# Patient Record
Sex: FEMALE | Race: WHITE | URBAN - METROPOLITAN AREA
[De-identification: names, ages, dates, MRNs, and addresses within clinical notes are randomized per-mention and may not be internally consistent; named-entity substitution may affect disease eponyms.]

---

## 2019-06-01 ENCOUNTER — OUTPATIENT (OUTPATIENT)
Dept: OUTPATIENT SERVICES | Facility: HOSPITAL | Age: 29
LOS: 1 days | End: 2019-06-01
Payer: MEDICAID

## 2019-06-01 PROCEDURE — G9001: CPT

## 2019-06-02 ENCOUNTER — EMERGENCY (EMERGENCY)
Facility: HOSPITAL | Age: 29
LOS: 0 days | Discharge: HOME | End: 2019-06-02

## 2019-06-02 ENCOUNTER — INPATIENT (INPATIENT)
Facility: HOSPITAL | Age: 29
LOS: 0 days | Discharge: LEFT BEFORE DISCHARGE | End: 2019-06-03
Attending: SURGERY | Admitting: SURGERY
Payer: MEDICAID

## 2019-06-02 VITALS
SYSTOLIC BLOOD PRESSURE: 122 MMHG | TEMPERATURE: 97 F | DIASTOLIC BLOOD PRESSURE: 60 MMHG | HEART RATE: 90 BPM | OXYGEN SATURATION: 98 % | RESPIRATION RATE: 20 BRPM

## 2019-06-02 VITALS
SYSTOLIC BLOOD PRESSURE: 113 MMHG | DIASTOLIC BLOOD PRESSURE: 81 MMHG | HEART RATE: 113 BPM | OXYGEN SATURATION: 97 % | TEMPERATURE: 101 F

## 2019-06-02 DIAGNOSIS — L03.113 CELLULITIS OF RIGHT UPPER LIMB: ICD-10-CM

## 2019-06-02 DIAGNOSIS — Z02.9 ENCOUNTER FOR ADMINISTRATIVE EXAMINATIONS, UNSPECIFIED: ICD-10-CM

## 2019-06-02 LAB
ANION GAP SERPL CALC-SCNC: 13 MMOL/L — SIGNIFICANT CHANGE UP (ref 7–14)
BASOPHILS # BLD AUTO: 0.05 K/UL — SIGNIFICANT CHANGE UP (ref 0–0.2)
BASOPHILS NFR BLD AUTO: 0.3 % — SIGNIFICANT CHANGE UP (ref 0–1)
BUN SERPL-MCNC: 12 MG/DL — SIGNIFICANT CHANGE UP (ref 10–20)
CALCIUM SERPL-MCNC: 9.2 MG/DL — SIGNIFICANT CHANGE UP (ref 8.5–10.1)
CHLORIDE SERPL-SCNC: 100 MMOL/L — SIGNIFICANT CHANGE UP (ref 98–110)
CO2 SERPL-SCNC: 21 MMOL/L — SIGNIFICANT CHANGE UP (ref 17–32)
CREAT SERPL-MCNC: 0.7 MG/DL — SIGNIFICANT CHANGE UP (ref 0.7–1.5)
EOSINOPHIL # BLD AUTO: 0.5 K/UL — SIGNIFICANT CHANGE UP (ref 0–0.7)
EOSINOPHIL NFR BLD AUTO: 2.9 % — SIGNIFICANT CHANGE UP (ref 0–8)
GLUCOSE SERPL-MCNC: 82 MG/DL — SIGNIFICANT CHANGE UP (ref 70–99)
HCT VFR BLD CALC: 39.1 % — SIGNIFICANT CHANGE UP (ref 37–47)
HGB BLD-MCNC: 13.4 G/DL — SIGNIFICANT CHANGE UP (ref 12–16)
HIV 1 & 2 AB SERPL IA.RAPID: SIGNIFICANT CHANGE UP
IMM GRANULOCYTES NFR BLD AUTO: 0.5 % — HIGH (ref 0.1–0.3)
LACTATE SERPL-SCNC: 0.6 MMOL/L — SIGNIFICANT CHANGE UP (ref 0.5–2.2)
LYMPHOCYTES # BLD AUTO: 15.4 % — LOW (ref 20.5–51.1)
LYMPHOCYTES # BLD AUTO: 2.68 K/UL — SIGNIFICANT CHANGE UP (ref 1.2–3.4)
MCHC RBC-ENTMCNC: 29 PG — SIGNIFICANT CHANGE UP (ref 27–31)
MCHC RBC-ENTMCNC: 34.3 G/DL — SIGNIFICANT CHANGE UP (ref 32–37)
MCV RBC AUTO: 84.6 FL — SIGNIFICANT CHANGE UP (ref 81–99)
MONOCYTES # BLD AUTO: 1.11 K/UL — HIGH (ref 0.1–0.6)
MONOCYTES NFR BLD AUTO: 6.4 % — SIGNIFICANT CHANGE UP (ref 1.7–9.3)
NEUTROPHILS # BLD AUTO: 12.96 K/UL — HIGH (ref 1.4–6.5)
NEUTROPHILS NFR BLD AUTO: 74.5 % — SIGNIFICANT CHANGE UP (ref 42.2–75.2)
NRBC # BLD: 0 /100 WBCS — SIGNIFICANT CHANGE UP (ref 0–0)
PLATELET # BLD AUTO: 289 K/UL — SIGNIFICANT CHANGE UP (ref 130–400)
POTASSIUM SERPL-MCNC: 3.6 MMOL/L — SIGNIFICANT CHANGE UP (ref 3.5–5)
POTASSIUM SERPL-SCNC: 3.6 MMOL/L — SIGNIFICANT CHANGE UP (ref 3.5–5)
RBC # BLD: 4.62 M/UL — SIGNIFICANT CHANGE UP (ref 4.2–5.4)
RBC # FLD: 11.9 % — SIGNIFICANT CHANGE UP (ref 11.5–14.5)
SODIUM SERPL-SCNC: 134 MMOL/L — LOW (ref 135–146)
WBC # BLD: 17.38 K/UL — HIGH (ref 4.8–10.8)
WBC # FLD AUTO: 17.38 K/UL — HIGH (ref 4.8–10.8)

## 2019-06-02 PROCEDURE — 99284 EMERGENCY DEPT VISIT MOD MDM: CPT

## 2019-06-02 PROCEDURE — 99283 EMERGENCY DEPT VISIT LOW MDM: CPT | Mod: 25

## 2019-06-02 PROCEDURE — 10060 I&D ABSCESS SIMPLE/SINGLE: CPT

## 2019-06-02 PROCEDURE — 73130 X-RAY EXAM OF HAND: CPT | Mod: 26,RT

## 2019-06-02 RX ORDER — SODIUM CHLORIDE 9 MG/ML
1000 INJECTION, SOLUTION INTRAVENOUS
Refills: 0 | Status: DISCONTINUED | OUTPATIENT
Start: 2019-06-02 | End: 2019-06-03

## 2019-06-02 RX ORDER — PANTOPRAZOLE SODIUM 20 MG/1
40 TABLET, DELAYED RELEASE ORAL DAILY
Refills: 0 | Status: DISCONTINUED | OUTPATIENT
Start: 2019-06-02 | End: 2019-06-03

## 2019-06-02 RX ORDER — AMPICILLIN SODIUM AND SULBACTAM SODIUM 250; 125 MG/ML; MG/ML
3 INJECTION, POWDER, FOR SUSPENSION INTRAMUSCULAR; INTRAVENOUS ONCE
Refills: 0 | Status: DISCONTINUED | OUTPATIENT
Start: 2019-06-02 | End: 2019-06-03

## 2019-06-02 RX ORDER — HEPARIN SODIUM 5000 [USP'U]/ML
5000 INJECTION INTRAVENOUS; SUBCUTANEOUS EVERY 8 HOURS
Refills: 0 | Status: DISCONTINUED | OUTPATIENT
Start: 2019-06-02 | End: 2019-06-03

## 2019-06-02 RX ORDER — SODIUM CHLORIDE 9 MG/ML
3 INJECTION INTRAMUSCULAR; INTRAVENOUS; SUBCUTANEOUS EVERY 8 HOURS
Refills: 0 | Status: DISCONTINUED | OUTPATIENT
Start: 2019-06-02 | End: 2019-06-03

## 2019-06-02 RX ORDER — IBUPROFEN 200 MG
600 TABLET ORAL ONCE
Refills: 0 | Status: COMPLETED | OUTPATIENT
Start: 2019-06-02 | End: 2019-06-02

## 2019-06-02 RX ORDER — CEFAZOLIN SODIUM 1 G
1000 VIAL (EA) INJECTION ONCE
Refills: 0 | Status: COMPLETED | OUTPATIENT
Start: 2019-06-02 | End: 2019-06-02

## 2019-06-02 RX ORDER — TETANUS TOXOID, REDUCED DIPHTHERIA TOXOID AND ACELLULAR PERTUSSIS VACCINE, ADSORBED 5; 2.5; 8; 8; 2.5 [IU]/.5ML; [IU]/.5ML; UG/.5ML; UG/.5ML; UG/.5ML
0.5 SUSPENSION INTRAMUSCULAR ONCE
Refills: 0 | Status: DISCONTINUED | OUTPATIENT
Start: 2019-06-02 | End: 2019-06-03

## 2019-06-02 RX ORDER — AMPICILLIN SODIUM AND SULBACTAM SODIUM 250; 125 MG/ML; MG/ML
INJECTION, POWDER, FOR SUSPENSION INTRAMUSCULAR; INTRAVENOUS
Refills: 0 | Status: DISCONTINUED | OUTPATIENT
Start: 2019-06-02 | End: 2019-06-03

## 2019-06-02 RX ORDER — AMPICILLIN SODIUM AND SULBACTAM SODIUM 250; 125 MG/ML; MG/ML
3 INJECTION, POWDER, FOR SUSPENSION INTRAMUSCULAR; INTRAVENOUS EVERY 6 HOURS
Refills: 0 | Status: DISCONTINUED | OUTPATIENT
Start: 2019-06-03 | End: 2019-06-03

## 2019-06-02 RX ADMIN — Medication 600 MILLIGRAM(S): at 13:18

## 2019-06-02 RX ADMIN — SODIUM CHLORIDE 3 MILLILITER(S): 9 INJECTION INTRAMUSCULAR; INTRAVENOUS; SUBCUTANEOUS at 16:04

## 2019-06-02 RX ADMIN — Medication 100 MILLIGRAM(S): at 14:18

## 2019-06-02 NOTE — ED PROVIDER NOTE - PHYSICAL EXAMINATION
CONST: Well appearing in NAD  NECK: Non-tender, no meningeal signs  CARD: Normal S1 S2; Normal rate and rhythm  RESP: Equal BS B/L, No wheezes, rhonchi or rales. No distress  MS: Normal ROM in all extremities. (+) R 4th finger deformity and pain with ROM.   SKIN: (+) R 4th finger with abrasion and surrounding cellulitis extending down to hand.  Good turgor  NEURO: A&Ox3, No focal deficits. Strength 5/5 with no sensory deficits. Steady gait

## 2019-06-02 NOTE — H&P ADULT - NSHPPHYSICALEXAM_GEN_ALL_CORE
Vital Signs Last 24 Hrs  T(C): 36.1 (02 Jun 2019 12:29), Max: 36.1 (02 Jun 2019 12:29)  T(F): 97 (02 Jun 2019 12:29), Max: 97 (02 Jun 2019 12:29)  HR: 90 (02 Jun 2019 12:29) (90 - 90)  BP: 122/60 (02 Jun 2019 12:29) (122/60 - 122/60)  BP(mean): --  RR: 20 (02 Jun 2019 12:29) (20 - 20)  SpO2: 98% (02 Jun 2019 12:29) (98% - 98%)\    exam: sitting up on exam table no acute distress  EXAM: Right hand edema and erythema extending to mid forearm

## 2019-06-02 NOTE — H&P ADULT - ASSESSMENT
29 y/o F with right hand cellulitis   - unasyn  - f/u ID   - hand elevation 29 y/o F with right hand cellulitis   - unasyn  - I&D with cx  -ESR, CRP  -Utox  - f/u ID   - hand elevation on posey block  - bid hand soaks and dressing changes

## 2019-06-02 NOTE — ED PROVIDER NOTE - CLINICAL SUMMARY MEDICAL DECISION MAKING FREE TEXT BOX
L 4th finger cellulitis extending to hand - wbc 17, iv abx given, Hand consulted, admitted to medicine for further mgmt

## 2019-06-02 NOTE — H&P ADULT - HISTORY OF PRESENT ILLNESS
Pt presents to ED c/o right hand swelling/deformity and pain associated with movement, also c/o limited strength. She states she thinks it is caused by an insect bite. no fever/chills. denies any iv drug abuse. Pt presents to ED c/o right hand swelling/deformity and pain associated with movement, also c/o limited strength. She states she thinks it is caused by an insect bite. no fever/chills. denies any iv drug abuse. admits to cocaine, ecstacy, marijuana, cigarette

## 2019-06-02 NOTE — ED PROVIDER NOTE - ATTENDING CONTRIBUTION TO CARE
28y f h/o etoh/drug abuse p/w R 4th digit pain x sev days. Pt states she thinks something bit her hand, will not provide additional details. Now with erythema & swelling up dorsal hand. Denies any other tx. Denies ivda. PE: young f nad, ncat, neck supple, rrr nl s1s2, ctab, ext- R 4th finger w/abrasion & surrounding cellulitis extending down to hand, digit & hand nvi, nl rom/strength/sensation throughout, cr<2s, dpi, remainder ext exam nl.

## 2019-06-02 NOTE — PROCEDURE NOTE - NSICDXPROCEDURE_GEN_ALL_CORE_FT
PROCEDURES:  Incision and drainage of wound of finger 02-Jun-2019 18:17:41 right 1st digit Yanci Gregory PROCEDURES:  Incision and drainage of wound of finger 02-Jun-2019 18:17:41 right 3rd digit Yanci Gregory

## 2019-06-02 NOTE — H&P ADULT - NSHPLABSRESULTS_GEN_ALL_CORE
13.4   17.38 )-----------( 289      ( 02 Jun 2019 14:13 )             39.1       Auto Neutrophil %: 74.5 % (06-02-19 @ 14:13)  Auto Immature Granulocyte %: 0.5 % (06-02-19 @ 14:13)    06-02    134<L>  |  100  |  12  ----------------------------<  82  3.6   |  21  |  0.7      Calcium, Total Serum: 9.2 mg/dL (06-02-19 @ 14:13)      LFTs:     Lactate, Blood: 0.6 mmol/L (06-02-19 @ 14:13)    < from: Xray Hand 3 Views, Right (06.02.19 @ 13:14) >    Findings/  Impression:    No definite evidence of acute fracture or dislocation. Joint spaces are   grossly unremarkable. No inadvertent radiopaque foreign body.    < end of copied text >

## 2019-06-02 NOTE — H&P ADULT - ATTENDING COMMENTS
Pt underwent I&D with drainage of purulent fluid.  Received IV abx in ED.    Went to ED to see patient, who was pending admission to floor, for bedside exam.  ED notified/updated me and resident that patient eloped    Recall PRN ED return Spoke with Surgery consult resident -Daniel this afternoon.    27 yo F with history of substance abuse who presents to ED with right finger swelling with associated erythema with 3 day history of worsening redness and pain.  No fevers, chills.      Report of tenderness of fluctuant dorsal right ring finger with erythema, no acute pain of PROM of MCP/IPJs    WBC 17    A/P: right hand and finger cellulitis with dorsal ring finger abscess  Recommend admit for IV abx, I&D, and ID consult.    Pt underwent I&D of dorsal right ring finger with drainage of purulent fluid.  Received IV abx in ED.    I went to ED to see patient this evening at ~7:35 pm for bedside exam with Surgery Blue team intern.  She was pending admission to floor.  ED notified/updated me and resident that patient eloped from ED.    Recall PRN ED return

## 2019-06-02 NOTE — ED PROVIDER NOTE - PROGRESS NOTE DETAILS
Patient eloped with IV. RN called patient back to remove IV. Patient returned and IV removed. Patient left ED.

## 2019-06-02 NOTE — ED PROVIDER NOTE - NS ED ROS FT
Constitutional: See HPI. No fever/chills.  Neck: No neck pain or stiffness.  Cardiac: No chest pain, SOB or edema. No chest pain with exertion.  Respiratory: No cough or respiratory distress. No hemoptysis.   GI: No nausea, vomiting, diarrhea or abdominal pain.  MS: No myalgia, muscle weakness or back pain. (+) finger pain.   Neuro: No headache or weakness. No LOC.  Skin: (+) redness.   Except as documented in the HPI, all other systems are negative.

## 2019-06-02 NOTE — ED PROVIDER NOTE - OBJECTIVE STATEMENT
29 y/o F with PMH of ETOH and drug abuse, presents to ED c/o “I think something bit my R 4th finger, I have a lot of pain and swelling down the R hand now.” No trauma. No fever or chills. Denies IV drug abuse.

## 2019-06-03 ENCOUNTER — INPATIENT (INPATIENT)
Facility: HOSPITAL | Age: 29
LOS: 3 days | Discharge: HOME | End: 2019-06-07
Attending: HOSPITALIST | Admitting: HOSPITALIST
Payer: MEDICAID

## 2019-06-03 VITALS
TEMPERATURE: 99 F | RESPIRATION RATE: 18 BRPM | DIASTOLIC BLOOD PRESSURE: 79 MMHG | HEART RATE: 133 BPM | OXYGEN SATURATION: 99 % | SYSTOLIC BLOOD PRESSURE: 123 MMHG

## 2019-06-03 LAB
C TRACH RRNA SPEC QL NAA+PROBE: SIGNIFICANT CHANGE UP
N GONORRHOEA RRNA SPEC QL NAA+PROBE: DETECTED
SPECIMEN SOURCE: SIGNIFICANT CHANGE UP

## 2019-06-03 PROCEDURE — 99285 EMERGENCY DEPT VISIT HI MDM: CPT | Mod: 25

## 2019-06-03 RX ORDER — VANCOMYCIN HCL 1 G
1000 VIAL (EA) INTRAVENOUS ONCE
Refills: 0 | Status: COMPLETED | OUTPATIENT
Start: 2019-06-03 | End: 2019-06-04

## 2019-06-03 RX ORDER — CEFTRIAXONE 500 MG/1
1 INJECTION, POWDER, FOR SOLUTION INTRAMUSCULAR; INTRAVENOUS ONCE
Refills: 0 | Status: COMPLETED | OUTPATIENT
Start: 2019-06-03 | End: 2019-06-03

## 2019-06-03 RX ORDER — SODIUM CHLORIDE 9 MG/ML
1000 INJECTION, SOLUTION INTRAVENOUS ONCE
Refills: 0 | Status: COMPLETED | OUTPATIENT
Start: 2019-06-03 | End: 2019-06-03

## 2019-06-03 NOTE — ED PROVIDER NOTE - OBJECTIVE STATEMENT
29 y/o female with no PMH who presents to ED for infection to the right middle finger. Pt was seen in this ED yesterday and admitted however she eloped. Now states redness and swelling have increased prompting pt come to ed for evaluation.

## 2019-06-03 NOTE — CHART NOTE - NSCHARTNOTEFT_GEN_A_CORE
During rounds at 7:30pm yesterday patient was not seen in ED room.  Bed management called, stated patient was moved to 4B 21B.  Patient was not present on 4B and nurse covering stated she had not gotten report for the patient yet.  ED PA stated that the patient was seen smoking outside the hospital s/p I&D of right finger.  Patient, at that time, was counseled on the importance of staying and receiving proper care with IV antibiotics and she stated that she "just wanted to go out and smoke".  Patient never returned to ED room, police were contacted and patient was found and returned to ED.  Patient at that time stated she did not want to stay, patient's IV was removed, and patient left ED despite recommendations to stay for proper medical care.

## 2019-06-03 NOTE — ED PROVIDER NOTE - PHYSICAL EXAMINATION
CONSTITUTIONAL: Well-developed; well-nourished; in no acute distress.   SKIN: warm, dry  HEAD: Normocephalic; atraumatic.  EYES: no conjunctival injection. PERRL.   ENT: No nasal discharge; airway clear.  NECK: Supple; non tender.  CARD: S1, S2 normal; no murmurs, gallops, or rubs. Regular rate and rhythm.   RESP: No wheezes, rales or rhonchi.  ABD: soft ntnd  EXT: + swelling of the right middle finger with held in flexion and pain on extension. no ttp over flexor surface. Normal ROM.  No clubbing, cyanosis or edema.   LYMPH: No acute cervical adenopathy.  NEURO: Alert, oriented, grossly unremarkable  PSYCH: Cooperative, appropriate.

## 2019-06-03 NOTE — ED PROVIDER NOTE - ATTENDING CONTRIBUTION TO CARE
I personally evaluated the patient. I reviewed the Resident’s or Physician Assistant’s note (as assigned above), and agree with the findings and plan except as documented in my note.    27 y/o female with no PMH who presents to ED for infection to the right middle finger. Pt was seen in this ED yesterday and admitted but eloped. Now coming back for wrosening infection. On exam r middle finger consistent w infectious tenosynovitis. Labs, cultures, abx ordered. Hand suregry consult placed.     Plan-IV abx and admit

## 2019-06-03 NOTE — ED PROVIDER NOTE - PROGRESS NOTE DETAILS
discussed with surgery as pt was admitted to their service yesterday, will evaluate pt. Ortho on call for hand surgery. Multiple pages placed to ortho with no call back Per ortho admit and they will see pt in the AM.

## 2019-06-04 DIAGNOSIS — Z71.89 OTHER SPECIFIED COUNSELING: ICD-10-CM

## 2019-06-04 LAB
-  AMPICILLIN/SULBACTAM: SIGNIFICANT CHANGE UP
-  CEFAZOLIN: SIGNIFICANT CHANGE UP
-  CLINDAMYCIN: SIGNIFICANT CHANGE UP
-  DAPTOMYCIN: SIGNIFICANT CHANGE UP
-  ERYTHROMYCIN: SIGNIFICANT CHANGE UP
-  GENTAMICIN: SIGNIFICANT CHANGE UP
-  LINEZOLID: SIGNIFICANT CHANGE UP
-  OXACILLIN: SIGNIFICANT CHANGE UP
-  PENICILLIN: SIGNIFICANT CHANGE UP
-  RIFAMPIN: SIGNIFICANT CHANGE UP
-  TETRACYCLINE: SIGNIFICANT CHANGE UP
-  TRIMETHOPRIM/SULFAMETHOXAZOLE: SIGNIFICANT CHANGE UP
-  VANCOMYCIN: SIGNIFICANT CHANGE UP
ALBUMIN SERPL ELPH-MCNC: 3.6 G/DL — SIGNIFICANT CHANGE UP (ref 3.5–5.2)
ALBUMIN SERPL ELPH-MCNC: 3.9 G/DL — SIGNIFICANT CHANGE UP (ref 3.5–5.2)
ALP SERPL-CCNC: 71 U/L — SIGNIFICANT CHANGE UP (ref 30–115)
ALP SERPL-CCNC: 81 U/L — SIGNIFICANT CHANGE UP (ref 30–115)
ALT FLD-CCNC: 12 U/L — SIGNIFICANT CHANGE UP (ref 0–41)
ALT FLD-CCNC: 13 U/L — SIGNIFICANT CHANGE UP (ref 0–41)
AMPHET UR-MCNC: NEGATIVE — SIGNIFICANT CHANGE UP
ANION GAP SERPL CALC-SCNC: 14 MMOL/L — SIGNIFICANT CHANGE UP (ref 7–14)
ANION GAP SERPL CALC-SCNC: 15 MMOL/L — HIGH (ref 7–14)
APTT BLD: 30.6 SEC — SIGNIFICANT CHANGE UP (ref 27–39.2)
AST SERPL-CCNC: 14 U/L — SIGNIFICANT CHANGE UP (ref 0–41)
AST SERPL-CCNC: 14 U/L — SIGNIFICANT CHANGE UP (ref 0–41)
BARBITURATES UR SCN-MCNC: NEGATIVE — SIGNIFICANT CHANGE UP
BASE EXCESS BLDV CALC-SCNC: -0.1 MMOL/L — SIGNIFICANT CHANGE UP (ref -2–2)
BASOPHILS # BLD AUTO: 0.04 K/UL — SIGNIFICANT CHANGE UP (ref 0–0.2)
BASOPHILS # BLD AUTO: 0.06 K/UL — SIGNIFICANT CHANGE UP (ref 0–0.2)
BASOPHILS NFR BLD AUTO: 0.2 % — SIGNIFICANT CHANGE UP (ref 0–1)
BASOPHILS NFR BLD AUTO: 0.3 % — SIGNIFICANT CHANGE UP (ref 0–1)
BENZODIAZ UR-MCNC: NEGATIVE — SIGNIFICANT CHANGE UP
BILIRUB DIRECT SERPL-MCNC: <0.2 MG/DL — SIGNIFICANT CHANGE UP (ref 0–0.2)
BILIRUB INDIRECT FLD-MCNC: SIGNIFICANT CHANGE UP MG/DL (ref 0.2–1.2)
BILIRUB SERPL-MCNC: <0.2 MG/DL — SIGNIFICANT CHANGE UP (ref 0.2–1.2)
BILIRUB SERPL-MCNC: <0.2 MG/DL — SIGNIFICANT CHANGE UP (ref 0.2–1.2)
BLD GP AB SCN SERPL QL: SIGNIFICANT CHANGE UP
BUN SERPL-MCNC: 11 MG/DL — SIGNIFICANT CHANGE UP (ref 10–20)
BUN SERPL-MCNC: 7 MG/DL — LOW (ref 10–20)
CA-I SERPL-SCNC: 1.16 MMOL/L — SIGNIFICANT CHANGE UP (ref 1.12–1.3)
CALCIUM SERPL-MCNC: 8 MG/DL — LOW (ref 8.5–10.1)
CALCIUM SERPL-MCNC: 8.7 MG/DL — SIGNIFICANT CHANGE UP (ref 8.5–10.1)
CHLORIDE SERPL-SCNC: 100 MMOL/L — SIGNIFICANT CHANGE UP (ref 98–110)
CHLORIDE SERPL-SCNC: 101 MMOL/L — SIGNIFICANT CHANGE UP (ref 98–110)
CO2 SERPL-SCNC: 22 MMOL/L — SIGNIFICANT CHANGE UP (ref 17–32)
CO2 SERPL-SCNC: 22 MMOL/L — SIGNIFICANT CHANGE UP (ref 17–32)
COCAINE METAB.OTHER UR-MCNC: POSITIVE
CREAT SERPL-MCNC: 0.6 MG/DL — LOW (ref 0.7–1.5)
CREAT SERPL-MCNC: 0.7 MG/DL — SIGNIFICANT CHANGE UP (ref 0.7–1.5)
CRP SERPL-MCNC: 2.36 MG/DL — HIGH (ref 0–0.4)
EOSINOPHIL # BLD AUTO: 0.28 K/UL — SIGNIFICANT CHANGE UP (ref 0–0.7)
EOSINOPHIL # BLD AUTO: 0.3 K/UL — SIGNIFICANT CHANGE UP (ref 0–0.7)
EOSINOPHIL NFR BLD AUTO: 1.3 % — SIGNIFICANT CHANGE UP (ref 0–8)
EOSINOPHIL NFR BLD AUTO: 1.9 % — SIGNIFICANT CHANGE UP (ref 0–8)
ERYTHROCYTE [SEDIMENTATION RATE] IN BLOOD: 52 MM/HR — HIGH (ref 0–15)
GAS PNL BLDV: 142 MMOL/L — SIGNIFICANT CHANGE UP (ref 136–145)
GAS PNL BLDV: SIGNIFICANT CHANGE UP
GLUCOSE SERPL-MCNC: 107 MG/DL — HIGH (ref 70–99)
GLUCOSE SERPL-MCNC: 112 MG/DL — HIGH (ref 70–99)
HCG UR QL: NEGATIVE — SIGNIFICANT CHANGE UP
HCO3 BLDV-SCNC: 26 MMOL/L — SIGNIFICANT CHANGE UP (ref 22–29)
HCT VFR BLD CALC: 34.4 % — LOW (ref 37–47)
HCT VFR BLD CALC: 38.9 % — SIGNIFICANT CHANGE UP (ref 37–47)
HCT VFR BLDA CALC: 39.3 % — SIGNIFICANT CHANGE UP (ref 34–44)
HGB BLD CALC-MCNC: 12.8 G/DL — LOW (ref 14–18)
HGB BLD-MCNC: 11.5 G/DL — LOW (ref 12–16)
HGB BLD-MCNC: 13.2 G/DL — SIGNIFICANT CHANGE UP (ref 12–16)
IMM GRANULOCYTES NFR BLD AUTO: 0.5 % — HIGH (ref 0.1–0.3)
IMM GRANULOCYTES NFR BLD AUTO: 0.6 % — HIGH (ref 0.1–0.3)
INR BLD: 1.14 RATIO — SIGNIFICANT CHANGE UP (ref 0.65–1.3)
LACTATE BLDV-MCNC: 1.7 MMOL/L — HIGH (ref 0.5–1.6)
LYMPHOCYTES # BLD AUTO: 17.4 % — LOW (ref 20.5–51.1)
LYMPHOCYTES # BLD AUTO: 17.6 % — LOW (ref 20.5–51.1)
LYMPHOCYTES # BLD AUTO: 2.84 K/UL — SIGNIFICANT CHANGE UP (ref 1.2–3.4)
LYMPHOCYTES # BLD AUTO: 3.72 K/UL — HIGH (ref 1.2–3.4)
MAGNESIUM SERPL-MCNC: 2.3 MG/DL — SIGNIFICANT CHANGE UP (ref 1.8–2.4)
MCHC RBC-ENTMCNC: 28.6 PG — SIGNIFICANT CHANGE UP (ref 27–31)
MCHC RBC-ENTMCNC: 28.9 PG — SIGNIFICANT CHANGE UP (ref 27–31)
MCHC RBC-ENTMCNC: 33.4 G/DL — SIGNIFICANT CHANGE UP (ref 32–37)
MCHC RBC-ENTMCNC: 33.9 G/DL — SIGNIFICANT CHANGE UP (ref 32–37)
MCV RBC AUTO: 85.3 FL — SIGNIFICANT CHANGE UP (ref 81–99)
MCV RBC AUTO: 85.6 FL — SIGNIFICANT CHANGE UP (ref 81–99)
METHADONE UR-MCNC: NEGATIVE — SIGNIFICANT CHANGE UP
METHOD TYPE: SIGNIFICANT CHANGE UP
MONOCYTES # BLD AUTO: 0.97 K/UL — HIGH (ref 0.1–0.6)
MONOCYTES # BLD AUTO: 1.09 K/UL — HIGH (ref 0.1–0.6)
MONOCYTES NFR BLD AUTO: 5.1 % — SIGNIFICANT CHANGE UP (ref 1.7–9.3)
MONOCYTES NFR BLD AUTO: 6 % — SIGNIFICANT CHANGE UP (ref 1.7–9.3)
NEUTROPHILS # BLD AUTO: 11.87 K/UL — HIGH (ref 1.4–6.5)
NEUTROPHILS # BLD AUTO: 16.09 K/UL — HIGH (ref 1.4–6.5)
NEUTROPHILS NFR BLD AUTO: 73.7 % — SIGNIFICANT CHANGE UP (ref 42.2–75.2)
NEUTROPHILS NFR BLD AUTO: 75.4 % — HIGH (ref 42.2–75.2)
NRBC # BLD: 0 /100 WBCS — SIGNIFICANT CHANGE UP (ref 0–0)
NRBC # BLD: 0 /100 WBCS — SIGNIFICANT CHANGE UP (ref 0–0)
OPIATES UR-MCNC: NEGATIVE — SIGNIFICANT CHANGE UP
PCO2 BLDV: 47 MMHG — SIGNIFICANT CHANGE UP (ref 41–51)
PCP SPEC-MCNC: SIGNIFICANT CHANGE UP
PH BLDV: 7.35 — SIGNIFICANT CHANGE UP (ref 7.26–7.43)
PHOSPHATE SERPL-MCNC: 2.7 MG/DL — SIGNIFICANT CHANGE UP (ref 2.1–4.9)
PLATELET # BLD AUTO: 299 K/UL — SIGNIFICANT CHANGE UP (ref 130–400)
PLATELET # BLD AUTO: 322 K/UL — SIGNIFICANT CHANGE UP (ref 130–400)
PO2 BLDV: 35 MMHG — SIGNIFICANT CHANGE UP (ref 20–40)
POTASSIUM BLDV-SCNC: 3 MMOL/L — LOW (ref 3.3–5.6)
POTASSIUM SERPL-MCNC: 3.1 MMOL/L — LOW (ref 3.5–5)
POTASSIUM SERPL-MCNC: 3.8 MMOL/L — SIGNIFICANT CHANGE UP (ref 3.5–5)
POTASSIUM SERPL-SCNC: 3.1 MMOL/L — LOW (ref 3.5–5)
POTASSIUM SERPL-SCNC: 3.8 MMOL/L — SIGNIFICANT CHANGE UP (ref 3.5–5)
PROPOXYPHENE QUALITATIVE URINE RESULT: NEGATIVE — SIGNIFICANT CHANGE UP
PROT SERPL-MCNC: 6.4 G/DL — SIGNIFICANT CHANGE UP (ref 6–8)
PROT SERPL-MCNC: 7.2 G/DL — SIGNIFICANT CHANGE UP (ref 6–8)
PROTHROM AB SERPL-ACNC: 13.1 SEC — HIGH (ref 9.95–12.87)
RBC # BLD: 4.02 M/UL — LOW (ref 4.2–5.4)
RBC # BLD: 4.56 M/UL — SIGNIFICANT CHANGE UP (ref 4.2–5.4)
RBC # FLD: 11.7 % — SIGNIFICANT CHANGE UP (ref 11.5–14.5)
RBC # FLD: 11.8 % — SIGNIFICANT CHANGE UP (ref 11.5–14.5)
SAO2 % BLDV: 64 % — SIGNIFICANT CHANGE UP
SODIUM SERPL-SCNC: 137 MMOL/L — SIGNIFICANT CHANGE UP (ref 135–146)
SODIUM SERPL-SCNC: 137 MMOL/L — SIGNIFICANT CHANGE UP (ref 135–146)
WBC # BLD: 16.11 K/UL — HIGH (ref 4.8–10.8)
WBC # BLD: 21.35 K/UL — HIGH (ref 4.8–10.8)
WBC # FLD AUTO: 16.11 K/UL — HIGH (ref 4.8–10.8)
WBC # FLD AUTO: 21.35 K/UL — HIGH (ref 4.8–10.8)

## 2019-06-04 PROCEDURE — 99223 1ST HOSP IP/OBS HIGH 75: CPT | Mod: AI

## 2019-06-04 PROCEDURE — 93010 ELECTROCARDIOGRAM REPORT: CPT

## 2019-06-04 PROCEDURE — 73130 X-RAY EXAM OF HAND: CPT | Mod: 26,LT

## 2019-06-04 RX ORDER — CEFTRIAXONE 500 MG/1
INJECTION, POWDER, FOR SOLUTION INTRAMUSCULAR; INTRAVENOUS
Refills: 0 | Status: DISCONTINUED | OUTPATIENT
Start: 2019-06-04 | End: 2019-06-05

## 2019-06-04 RX ORDER — POTASSIUM CHLORIDE 20 MEQ
20 PACKET (EA) ORAL
Refills: 0 | Status: DISCONTINUED | OUTPATIENT
Start: 2019-06-04 | End: 2019-06-04

## 2019-06-04 RX ORDER — IBUPROFEN 200 MG
400 TABLET ORAL THREE TIMES A DAY
Refills: 0 | Status: DISCONTINUED | OUTPATIENT
Start: 2019-06-04 | End: 2019-06-05

## 2019-06-04 RX ORDER — MAGNESIUM SULFATE 500 MG/ML
2 VIAL (ML) INJECTION ONCE
Refills: 0 | Status: COMPLETED | OUTPATIENT
Start: 2019-06-04 | End: 2019-06-04

## 2019-06-04 RX ORDER — CEFAZOLIN SODIUM 1 G
1000 VIAL (EA) INJECTION ONCE
Refills: 0 | Status: COMPLETED | OUTPATIENT
Start: 2019-06-04 | End: 2019-06-04

## 2019-06-04 RX ORDER — ENOXAPARIN SODIUM 100 MG/ML
40 INJECTION SUBCUTANEOUS EVERY 24 HOURS
Refills: 0 | Status: DISCONTINUED | OUTPATIENT
Start: 2019-06-04 | End: 2019-06-07

## 2019-06-04 RX ORDER — CEFAZOLIN SODIUM 1 G
VIAL (EA) INJECTION
Refills: 0 | Status: DISCONTINUED | OUTPATIENT
Start: 2019-06-04 | End: 2019-06-04

## 2019-06-04 RX ORDER — ACYCLOVIR SODIUM 500 MG
400 VIAL (EA) INTRAVENOUS DAILY
Refills: 0 | Status: DISCONTINUED | OUTPATIENT
Start: 2019-06-04 | End: 2019-06-07

## 2019-06-04 RX ORDER — CEFTRIAXONE 500 MG/1
2 INJECTION, POWDER, FOR SOLUTION INTRAMUSCULAR; INTRAVENOUS ONCE
Refills: 0 | Status: COMPLETED | OUTPATIENT
Start: 2019-06-04 | End: 2019-06-04

## 2019-06-04 RX ORDER — ACETAMINOPHEN 500 MG
650 TABLET ORAL EVERY 6 HOURS
Refills: 0 | Status: DISCONTINUED | OUTPATIENT
Start: 2019-06-04 | End: 2019-06-05

## 2019-06-04 RX ORDER — SODIUM CHLORIDE 9 MG/ML
1000 INJECTION INTRAMUSCULAR; INTRAVENOUS; SUBCUTANEOUS
Refills: 0 | Status: DISCONTINUED | OUTPATIENT
Start: 2019-06-04 | End: 2019-06-07

## 2019-06-04 RX ORDER — AZITHROMYCIN 500 MG/1
1000 TABLET, FILM COATED ORAL ONCE
Refills: 0 | Status: COMPLETED | OUTPATIENT
Start: 2019-06-04 | End: 2019-06-04

## 2019-06-04 RX ORDER — CEFTRIAXONE 500 MG/1
2 INJECTION, POWDER, FOR SOLUTION INTRAMUSCULAR; INTRAVENOUS EVERY 24 HOURS
Refills: 0 | Status: DISCONTINUED | OUTPATIENT
Start: 2019-06-05 | End: 2019-06-05

## 2019-06-04 RX ORDER — AMPICILLIN SODIUM AND SULBACTAM SODIUM 250; 125 MG/ML; MG/ML
1 INJECTION, POWDER, FOR SUSPENSION INTRAMUSCULAR; INTRAVENOUS DAILY
Refills: 0 | Status: DISCONTINUED | OUTPATIENT
Start: 2019-06-04 | End: 2019-06-04

## 2019-06-04 RX ORDER — CEFAZOLIN SODIUM 1 G
1000 VIAL (EA) INJECTION EVERY 8 HOURS
Refills: 0 | Status: DISCONTINUED | OUTPATIENT
Start: 2019-06-04 | End: 2019-06-04

## 2019-06-04 RX ORDER — CHLORHEXIDINE GLUCONATE 213 G/1000ML
1 SOLUTION TOPICAL
Refills: 0 | Status: DISCONTINUED | OUTPATIENT
Start: 2019-06-04 | End: 2019-06-07

## 2019-06-04 RX ORDER — POTASSIUM CHLORIDE 20 MEQ
20 PACKET (EA) ORAL
Refills: 0 | Status: COMPLETED | OUTPATIENT
Start: 2019-06-04 | End: 2019-06-04

## 2019-06-04 RX ADMIN — CHLORHEXIDINE GLUCONATE 1 APPLICATION(S): 213 SOLUTION TOPICAL at 06:04

## 2019-06-04 RX ADMIN — Medication 20 MILLIEQUIVALENT(S): at 11:31

## 2019-06-04 RX ADMIN — CEFTRIAXONE 100 GRAM(S): 500 INJECTION, POWDER, FOR SOLUTION INTRAMUSCULAR; INTRAVENOUS at 00:50

## 2019-06-04 RX ADMIN — CEFTRIAXONE 100 GRAM(S): 500 INJECTION, POWDER, FOR SOLUTION INTRAMUSCULAR; INTRAVENOUS at 15:44

## 2019-06-04 RX ADMIN — Medication 250 MILLIGRAM(S): at 01:21

## 2019-06-04 RX ADMIN — SODIUM CHLORIDE 75 MILLILITER(S): 9 INJECTION INTRAMUSCULAR; INTRAVENOUS; SUBCUTANEOUS at 15:44

## 2019-06-04 RX ADMIN — Medication 650 MILLIGRAM(S): at 07:08

## 2019-06-04 RX ADMIN — ENOXAPARIN SODIUM 40 MILLIGRAM(S): 100 INJECTION SUBCUTANEOUS at 06:03

## 2019-06-04 RX ADMIN — AZITHROMYCIN 1000 MILLIGRAM(S): 500 TABLET, FILM COATED ORAL at 06:04

## 2019-06-04 RX ADMIN — Medication 100 MILLIGRAM(S): at 06:25

## 2019-06-04 RX ADMIN — Medication 400 MILLIGRAM(S): at 11:31

## 2019-06-04 RX ADMIN — Medication 20 MILLIEQUIVALENT(S): at 06:03

## 2019-06-04 RX ADMIN — Medication 50 GRAM(S): at 05:05

## 2019-06-04 RX ADMIN — SODIUM CHLORIDE 2000 MILLILITER(S): 9 INJECTION, SOLUTION INTRAVENOUS at 00:50

## 2019-06-04 RX ADMIN — Medication 1 TABLET(S): at 11:31

## 2019-06-04 RX ADMIN — SODIUM CHLORIDE 75 MILLILITER(S): 9 INJECTION INTRAMUSCULAR; INTRAVENOUS; SUBCUTANEOUS at 05:00

## 2019-06-04 NOTE — H&P ADULT - HISTORY OF PRESENT ILLNESS
28 year old female with a PMHx of  EtOH and non IV drug use presenting with right 3rd and 4th finger swelling and erythema due to Staph Aureus abscess formation and associated cellulitis. Patient presented to the ED on 06/02 initially due to her symptoms that had been occurring for 3 days following  a bite from an unknown source. An I and D was performed of the dorsal right ring finger with drainage of purulent fluid which grew Staph Aureus. Her lab values were largely benign other than leukocytosis to 17 and a positive test for gonorrhea. She was to be admitted and transferred to  but went outside to smoke a cigarette after the I and D, without returning.  The police were contacted and the patient was found and returned to the ED.  She explained at that time that she did not want to stay despite medical advice given my staff, her IV was removed and left against medical advice.  She returned on 06/03 as her the pain in her hand increased and she grew concerned. In the ED she received Ceftriaxone and Vancomycin. She denies chest pain, shortness of breath,  fever, chills and denies IV drug use. 28 year old female with a PMHx of  EtOH, polysubstance abuse with right 3rd and 4th finger swelling and erythema due to Staph Aureus abscess formation and associated cellulitis. Patient presented to the ED on 06/02 initially due to her symptoms that had been occurring for 3 days following  a bite from an unknown source. An I and D was performed of the dorsal right ring finger with drainage of purulent fluid which grew Staph Aureus. Her lab values were largely benign other than leukocytosis to 17 and a positive test for gonorrhea. She was to be admitted and transferred to  but went outside to smoke a cigarette after the I and D, without returning.  The police were contacted and the patient was found and returned to the ED.  She explained at that time that she did not want to stay despite medical advice given my staff, her IV was removed and left against medical advice.  She returned on 06/03 as her the pain in her hand increased and she grew concerned. In the ED she received Ceftriaxone and Vancomycin. She denies chest pain, shortness of breath,  fever, chills and denies recent  IV drug use. 28 year old female with a PMHx of  EtOH, polysubstance abuse with right 3rd and 4th finger swelling and erythema due to Staph Aureus abscess formation and associated cellulitis. Patient presented to the ED on 06/02 initially due to her symptoms that had been occurring for 3 days following  a bite from an unknown source. An I and D was performed of the dorsal right ring finger with drainage of purulent fluid which grew Staph Aureus. Her lab values were  benign other than leukocytosis to 17 and a positive test for gonorrhea. She was to be admitted and transferred to  but went outside to smoke a cigarette after the I and D, without returning.  The police were contacted and the patient was found and returned to the ED.  She explained at that time that she did not want to stay despite medical advice given by staff, her IV was removed and she left against medical advice.  She returned on 06/03 as her the pain in her hand increased and she grew concerned. In the ED she received Ceftriaxone and Vancomycin. She denies chest pain, shortness of breath,  fever, chills and denies recent  IV drug use.

## 2019-06-04 NOTE — ED ADULT NURSE NOTE - OBJECTIVE STATEMENT
Pt reports worsening pain and pus discharged from right hand abscess. Right hand noted with swelling, skin break at the 4th digit with no discharge, and redness. Difficulty moving finger noted due to pain. Breathing unlabored, breath sounds clear bilateral, s1s2 no extra heart sounds noted.

## 2019-06-04 NOTE — H&P ADULT - NSHPSOCIALHISTORY_GEN_ALL_CORE
Marital Status:  (   )    (   ) Single    (   )    (  )   Occupation:   Lives with: (  ) alone  (  ) children   (  ) spouse   (  ) parents  (  ) other  Recent Travel:     Substance Use (street drugs): (  ) never used  (  ) other:  Tobacco Usage:  (   ) never smoked   (   ) former smoker   (   ) current smoker  (     ) pack year  Alcohol Usage:  Sexual History: Marital Status:  (   )    ( x  ) Single    (   )    (  )   Occupation:   Lives with: (  ) alone  (  ) children   (  ) spouse   ( x ) parents  (  ) other  Recent Travel: None    Substance Use (street drugs): (  ) never used  (  ) other: Heroine use last time 8 years ago   Tobacco Usage:  (   ) never smoked   (   ) former smoker   ( x   ) current smoker  (  11   ) pack year  Alcohol Usage:  Drinks 2 8 ounce glasses Liquor daily   Sexual History:  Multiple sexual partners Marital Status:  (   )    ( x  ) Single    (   )    (  )   Occupation:   Lives with: (  ) alone  (  ) children   (  ) spouse   ( x ) parents  (  ) other  Recent Travel: None    Substance Use (street drugs): (  ) never used  (  ) other: Heroine use last time 8 years ago   Tobacco Usage:  (   ) never smoked   (   ) former smoker   ( x   ) current smoker  (  11   ) pack year  Alcohol Usage:  Drinks about 16 ounces of  Liquor daily   Sexual History:  Multiple sexual partners

## 2019-06-04 NOTE — ED ADULT NURSE NOTE - NSIMPLEMENTINTERV_GEN_ALL_ED
Implemented All Universal Safety Interventions:  Aladdin to call system. Call bell, personal items and telephone within reach. Instruct patient to call for assistance. Room bathroom lighting operational. Non-slip footwear when patient is off stretcher. Physically safe environment: no spills, clutter or unnecessary equipment. Stretcher in lowest position, wheels locked, appropriate side rails in place.

## 2019-06-04 NOTE — CONSULT NOTE ADULT - EXTREMITIES COMMENTS
right ring finger proximal phalanx dorsum a necrotic ulcer on an erythematous/ edematous base. Flexion-extension of finger associated with pain, Achillis tendon not associated with pain on palpation

## 2019-06-04 NOTE — H&P ADULT - ATTENDING COMMENTS
Patient seen and examined independently. Agree with resident note/ history / physical exam and plan of care with following exceptions/additions/updates. Case discussed with house-staff, nursing and patient.     pt is sleepy. can wake up with voice command but wants to sleep.   Vital Signs Last 24 Hrs  T(C): 36.7 (04 Jun 2019 08:37), Max: 37.6 (03 Jun 2019 23:57)  T(F): 98 (04 Jun 2019 08:37), Max: 99.7 (03 Jun 2019 23:57)  HR: 95 (04 Jun 2019 08:37) (95 - 133)  BP: 114/76 (04 Jun 2019 08:37) (114/76 - 124/84)  BP(mean): --  RR: 18 (04 Jun 2019 08:37) (18 - 20)  SpO2: 100% (03 Jun 2019 23:57) (99% - 100%)  Physical exam:   constitutional NAD, sleepy but arousable with voice, Respiratory  lungs CTA, CVS heart RRR, GI: abdomen Soft NT, ND, BS+, skin: intact  neuro exam non focal. right hand swelling and redness with a black scar on the dorsum of the mid finger. tender.   extensive tattoos.                           11.5   16.11 )-----------( 299      ( 04 Jun 2019 06:46 )             34.4   06-04    137  |  101  |  7<L>  ----------------------------<  107<H>  3.8   |  22  |  0.6<L>    Ca    8.0<L>      04 Jun 2019 06:46  Phos  2.7     06-04  Mg     2.3     06-04    TPro  6.4  /  Alb  3.6  /  TBili  <0.2  /  DBili  <0.2  /  AST  14  /  ALT  12  /  AlkPhos  71  06-04    Culture - Abscess with Gram Stain (06.02.19 @ 16:22)    Specimen Source: .Abscess Arm - Right    Culture Results:   Moderate Staphylococcus aureus    Chlamydia/GC Nucleic Acid Amplification (06.02.19 @ 15:50)    Source Amp: Urine: The clinical significance of positive results should be considered in  conjunction with the overall clinical presentation of the patient. Result  is not intended to be used as the sole means for clinical diagnosis or  patient management decisions.    Chlamydia Amplification Result: NotDetec: Method: Transcription Mediated Amplification (TMA) using  [employer] [place].  This assay is not intended for the evaluation of suspected  sexual abuse or other medico-legal reason. The performance  of this test has not been evaluated in women < 16 years of  age.  Test performed at [facility], [facility] [address].    GC Amplification Result: Detected: Method: Transcription Mediated Amplification (TMA) using  [employer] [place].  This assay is not intended for the evaluation of suspected  sexual abuse or other medico-legal reason. The performance  of this test has not been evaluated in women < 16 years of  age.  Test performed at [facility], [facility] [address].      a/p  #sepsis, due to infected hand joint( arthritis) positive staph ( on the wound)   consider vanco    # positive GC, rocephine.     # etoh dependency, no sign of withdrawal at this time. but pt is very sleepy, check urine for other drugs. thiamin and folate for possible folic acid and thiamin deficiency, dietition consult for suspected protein malnutrition, PRN benzo in case pt starts going into withdrawal.     #Progress Note Handoff  Pending (specify):  Consults ID , tests: sensitivity and cultures  Family discussion: dw pt, doubt she is awake enough to understand but need to get her consent before talking to the contact listed on the chart.   Disposition: Home when stable and cleared by ID. , reassess by sw before dc. Patient seen and examined independently. Agree with resident note/ history / physical exam and plan of care with following exceptions/additions/updates. Case discussed with house-staff, nursing and patient.     pt is sleepy. can wake up with voice command but wants to sleep.   Vital Signs Last 24 Hrs  T(C): 36.7 (04 Jun 2019 08:37), Max: 37.6 (03 Jun 2019 23:57)  T(F): 98 (04 Jun 2019 08:37), Max: 99.7 (03 Jun 2019 23:57)  HR: 95 (04 Jun 2019 08:37) (95 - 133)  BP: 114/76 (04 Jun 2019 08:37) (114/76 - 124/84)  BP(mean): --  RR: 18 (04 Jun 2019 08:37) (18 - 20)  SpO2: 100% (03 Jun 2019 23:57) (99% - 100%)  Physical exam:   constitutional NAD, sleepy but arousable with voice, Respiratory  lungs CTA, CVS heart RRR, GI: abdomen Soft NT, ND, BS+, skin: intact  neuro exam non focal. right hand swelling and redness with a black scar on the dorsum of the mid finger. tender.   extensive tattoos.                           11.5   16.11 )-----------( 299      ( 04 Jun 2019 06:46 )             34.4   06-04    137  |  101  |  7<L>  ----------------------------<  107<H>  3.8   |  22  |  0.6<L>    Ca    8.0<L>      04 Jun 2019 06:46  Phos  2.7     06-04  Mg     2.3     06-04    TPro  6.4  /  Alb  3.6  /  TBili  <0.2  /  DBili  <0.2  /  AST  14  /  ALT  12  /  AlkPhos  71  06-04    Culture - Abscess with Gram Stain (06.02.19 @ 16:22)    Specimen Source: .Abscess Arm - Right    Culture Results:   Moderate Staphylococcus aureus    Chlamydia/GC Nucleic Acid Amplification (06.02.19 @ 15:50)    Source Amp: Urine: The clinical significance of positive results should be considered in  conjunction with the overall clinical presentation of the patient. Result  is not intended to be used as the sole means for clinical diagnosis or  patient management decisions.    Chlamydia Amplification Result: NotDetec: Method: Transcription Mediated Amplification (TMA) using  [employer] [place].  This assay is not intended for the evaluation of suspected  sexual abuse or other medico-legal reason. The performance  of this test has not been evaluated in women < 16 years of  age.  Test performed at [facility], [facility] [address].    GC Amplification Result: Detected: Method: Transcription Mediated Amplification (TMA) using  [employer] [place].  This assay is not intended for the evaluation of suspected  sexual abuse or other medico-legal reason. The performance  of this test has not been evaluated in women < 16 years of  age.  Test performed at [facility], [facility] [address].      a/p  #sepsis, due to infected hand joint( arthritis) positive staph ( on the wound)   consider vanco    # positive GC, rocephine.     # etoh dependency, no sign of withdrawal at this time. but pt is very sleepy, check urine for other drugs. thiamin and folate for possible folic acid and thiamin deficiency, dietition consult for suspected protein malnutrition, PRN benzo in case pt starts going into withdrawal.     # check urine preg test.     #Progress Note Handoff  Pending (specify):  Consults ID , tests: sensitivity and cultures, urine tox and pregnancy test  Family discussion: dw pt, doubt she is awake enough to understand but need to get her consent before talking to the contact listed on the chart.   Disposition: Home when stable and cleared by ID. , reassess by sw before dc. Patient seen and examined independently. Agree with resident note/ history / physical exam and plan of care with following exceptions/additions/updates. Case discussed with house-staff, nursing and patient.     pt is sleepy. can wake up with voice command but wants to sleep.   Vital Signs Last 24 Hrs  T(C): 36.7 (04 Jun 2019 08:37), Max: 37.6 (03 Jun 2019 23:57)  T(F): 98 (04 Jun 2019 08:37), Max: 99.7 (03 Jun 2019 23:57)  HR: 95 (04 Jun 2019 08:37) (95 - 133)  BP: 114/76 (04 Jun 2019 08:37) (114/76 - 124/84)  BP(mean): --  RR: 18 (04 Jun 2019 08:37) (18 - 20)  SpO2: 100% (03 Jun 2019 23:57) (99% - 100%)  Physical exam:   constitutional NAD, sleepy but arousable with voice, Respiratory  lungs CTA, CVS heart RRR, GI: abdomen Soft NT, ND, BS+, skin: intact  neuro exam non focal. right hand swelling and redness with a black scar on the dorsum of the mid finger. tender.   extensive tattoos.                           11.5   16.11 )-----------( 299      ( 04 Jun 2019 06:46 )             34.4   06-04    137  |  101  |  7<L>  ----------------------------<  107<H>  3.8   |  22  |  0.6<L>    Ca    8.0<L>      04 Jun 2019 06:46  Phos  2.7     06-04  Mg     2.3     06-04    TPro  6.4  /  Alb  3.6  /  TBili  <0.2  /  DBili  <0.2  /  AST  14  /  ALT  12  /  AlkPhos  71  06-04    Culture - Abscess with Gram Stain (06.02.19 @ 16:22)    Specimen Source: .Abscess Arm - Right    Culture Results:   Moderate Staphylococcus aureus    Chlamydia/GC Nucleic Acid Amplification (06.02.19 @ 15:50)    Source Amp: Urine: The clinical significance of positive results should be considered in  conjunction with the overall clinical presentation of the patient. Result  is not intended to be used as the sole means for clinical diagnosis or  patient management decisions.    Chlamydia Amplification Result: NotDetec: Method: Transcription Mediated Amplification (TMA) using  [employer] [place].  This assay is not intended for the evaluation of suspected  sexual abuse or other medico-legal reason. The performance  of this test has not been evaluated in women < 16 years of  age.  Test performed at [facility], [facility] [address].    GC Amplification Result: Detected: Method: Transcription Mediated Amplification (TMA) using  [employer] [place].  This assay is not intended for the evaluation of suspected  sexual abuse or other medico-legal reason. The performance  of this test has not been evaluated in women < 16 years of  age.  Test performed at [facility], [facility] [address].      a/p  #sepsis, due to infected hand joint( arthritis) positive staph ( on the wound)   consider vanco    # positive GC, rocephine. needs gyn follow up for screening for other STD's including syphilis and HIV and hepatitis.     # etoh dependency, no sign of withdrawal at this time. but pt is very sleepy, check urine for other drugs. thiamin and folate for possible folic acid and thiamin deficiency, dietition consult for suspected protein malnutrition, PRN benzo in case pt starts going into withdrawal.     # check urine preg test.     #Progress Note Handoff  Pending (specify):  Consults ID , tests: sensitivity and cultures, urine tox and pregnancy test  Family discussion: dw pt, doubt she is awake enough to understand but need to get her consent before talking to the contact listed on the chart.   Disposition: Home when stable and cleared by ID. , reassess by sw before dc.

## 2019-06-04 NOTE — PROVIDER CONTACT NOTE (OTHER) - BACKGROUND
Educated pt on risk for education & bleeding from doing such an act and that we do have a consult open for the team to assess wound

## 2019-06-04 NOTE — H&P ADULT - NSHPLABSRESULTS_GEN_ALL_CORE
13.2   21.35 )-----------( 322      ( 04 Jun 2019 00:20 )             38.9       06-04    137  |  100  |  11  ----------------------------<  112<H>  3.1<L>   |  22  |  0.7    Ca    8.7      04 Jun 2019 00:20    TPro  7.2  /  Alb  3.9  /  TBili  <0.2  /  DBili  x   /  AST  14  /  ALT  13  /  AlkPhos  81  06-04          PT/INR - ( 04 Jun 2019 00:20 )   PT: 13.10 sec;   INR: 1.14 ratio         PTT - ( 04 Jun 2019 00:20 )  PTT:30.6 sec    Lactate Trend  06-02 @ 14:13 Lactate:0.6     CAPILLARY BLOOD GLUCOSE    Culture Results:   Moderate Staphylococcus aureus (06-02 @ 16:22)  Culture Results:   No growth to date. (06-02 @ 14:13)  Culture Results:   No growth to date. (06-02 @ 14:13)      XR HAND MIN 3 VIEWS RT          PROCEDURE DATE:  06/02/2019      Impression:    No definite evidence of acute fracture or dislocation.   Joint spaces are  grossly unremarkable.   No inadvertent radiopaque foreign body.

## 2019-06-04 NOTE — H&P ADULT - NSHPREVIEWOFSYSTEMS_GEN_ALL_CORE
CONSTITUTIONAL - No fever, No diaphoresis, No weight change  SKIN -  (+) R 4th finger with abrasion and surrounding cellulitis extending down to hand.  HEMATOLOGIC - No abnormal bleeding or bruising  EYES - No eye pain, No blurred vision  ENT - No change in hearing, No sore throat, No neck pain, No rhinorrhea, No ear pain  RESPIRATORY - No shortness of breath, No cough  CARDIAC -No chest pain, No palpitations  GI - No abdominal pain, No nausea, No vomiting, No diarrhea, No constipation, No bright red blood per rectum or melena. No flank pain  - No dysuria, frequency, hematuria.   ENDO - No polydypsia, No polyuria, No heat/cold intolerance  MUSCULOSKELETAL - No joint paint, No swelling, No back pain  NEUROLOGIC - No numbness, No focal weakness, No headache, No dizziness  All other systems negative, unless specified in HPI CONSTITUTIONAL - No fever, No diaphoresis, No weight change  SKIN -  (+) R 4th finger with abrasion and surrounding cellulitis extending down to hand.  HEMATOLOGIC - No abnormal bleeding or bruising  EYES - No eye pain, No blurred vision  ENT - No change in hearing, No sore throat, No neck pain, No rhinorrhea, No ear pain  RESPIRATORY - No shortness of breath, No cough  CARDIAC -No chest pain, No palpitations  GI - No abdominal pain, No nausea, No vomiting, No diarrhea, No constipation, No bright red blood per rectum or melena. No flank pain  - + dysuria, no frequency, hematuria.   ENDO - No polydypsia, No polyuria, No heat/cold intolerance  MUSCULOSKELETAL - No joint paint, No swelling, No back pain  NEUROLOGIC - No numbness, No focal weakness, No headache, No dizziness  All other systems negative, unless specified in HPI

## 2019-06-04 NOTE — H&P ADULT - NSHPPHYSICALEXAM_GEN_ALL_CORE
VITAL SIGNS: AFebrile, vital signs stable  CONSTITUTIONAL: Well-developed; well-nourished; in no acute distress.  SKIN:  tenderness of fluctuant dorsal right ring finger with erythema  HEAD: Normocephalic; atraumatic.  EYES: Pupils equal round reactive to light, Extraocular movements intact; conjunctiva and sclera clear.  ENT: No nasal discharge; airway clear. Moist mucus membranes.  NECK: Supple; non tender. No rigidity  CARD: Regular rate and rhythm. Normal S1, S2; no murmurs, gallops, or rubs.  RESP: Lungs clear to auscultation bilaterally. No wheezes, rales or rhonchi.  ABD: Abdomen soft; non-tender; non-distended;  no hepatosplenomegaly. No costovertebral angle tenderness.   EXT: Normal ROM. No clubbing, cyanosis or edema. No calf tenderness to palpation.  NEURO: Alert and oriented x 3. No focal deficits.  PSYCH: Cooperative, appropriate. VITAL SIGNS: A Febrile, vital signs stable  CONSTITUTIONAL: Well-developed; well-nourished; in no acute distress.  SKIN:  tenderness of fluctuant dorsal right ring finger with erythema  HEAD: Normocephalic; atraumatic.  EYES: Pupils equal round reactive to light, Extraocular movements intact; conjunctiva and sclera clear.  ENT: No nasal discharge; airway clear. Moist mucus membranes.  NECK: Supple; non tender. No rigidity  CARD: Tachycardic. Normal S1, S2; no murmurs, gallops, or rubs.  RESP: Lungs clear to auscultation bilaterally. No wheezes, rales or rhonchi.  ABD: Abdomen soft; non-tender; non-distended;  no hepatosplenomegaly. No costovertebral angle tenderness.   EXT: Normal ROM. No clubbing, cyanosis or edema. No calf tenderness to palpation.  NEURO: Alert and oriented x 3. No focal deficits.  PSYCH: Cooperative, appropriate. VITAL SIGNS: A Febrile, vital signs stable  CONSTITUTIONAL: Well-developed; well-nourished; in no acute distress.  SKIN:  tenderness of fluctuant dorsal right ring finger with erythema, perioral vesicular lesions   HEAD: Normocephalic; atraumatic.  EYES: Pupils equal round reactive to light, Extraocular movements intact; conjunctiva and sclera clear.  ENT: No nasal discharge; airway clear. Moist mucus membranes.  NECK: Supple; non tender. No rigidity  CARD: Tachycardic. Normal S1, S2; no murmurs, gallops, or rubs.  RESP: Lungs clear to auscultation bilaterally. No wheezes, rales or rhonchi.  ABD: Abdomen soft; non-tender; non-distended;  no hepatosplenomegaly. No costovertebral angle tenderness.   EXT: Normal ROM. No clubbing, cyanosis or edema. No calf tenderness to palpation.  NEURO: Alert and oriented x 3. No focal deficits.  PSYCH: Cooperative, appropriate.

## 2019-06-04 NOTE — H&P ADULT - ASSESSMENT
28 year old female with a PMHx of  EtOH and non IV drug use presenting with right 3rd and 4th finger swelling and erythema due to Staph Aureus abscess formation and associated cellulitis.     #) Sepsis secondary to Staph Aureus Abscess and associated cellulitis   -Leukocytosis 21.35, Tachycardic to 120s, +'ve source  -Possibly secondary to a bite of unknown origin   -Rt Hand XR wnl   -s/p I and D, 1 gram Vancomycin   -Blood cultures neg x 2, +'ve wound culture for Staph Aureus   -Cont IVF  -ID consult   -Unasyn 1 gram q8h    #) Hypokalemia  -potassium 20 mEq x 3 doses    #) Gonococcal Infection  -Asymptomatic  -negative for Chlamydia   -s/p CTX 1 gram IV (in ED) and Azithromycin 1 gram PO due to antimicrobial resistance    DVT ppx- Lovenox    GI ppx- not indicated    Dispo: Home when stable 28 year old female with a PMHx of  EtOH and non IV drug use presenting with right 3rd and 4th finger swelling and erythema due to Staph Aureus abscess formation and associated cellulitis.     #) Sepsis secondary to Staph Aureus Abscess and associated cellulitis   -Leukocytosis 21.35, Tachycardic to 120s, +'ve source  -Possibly secondary to a bite of unknown origin   -Rt Hand XR wnl   -s/p I and D, 1 gram Vancomycin   -Blood cultures neg x 2, +'ve wound culture for Staph Aureus   -Cont IVF  -ID consult   -Unasyn 1 gram q8h    #) Hypokalemia  -potassium 20 mEq x 3 doses    #) Gonococcal Infection  -Asymptomatic  -negative for Chlamydia   -s/p CTX 1 gram IV (in ED) and Azithromycin 1 gram PO due to antimicrobial resistance    #) Herpes Labialis   -Valacyclovir 2 g twice daily for 1 day     #) EtOH Use Disorder  -Drinks up to 16 ounces of liquor daily  -CIWA protocol     DVT ppx- Lovenox    GI ppx- not indicated    Dispo: Home when stable 28 year old female with a PMHx of  EtOH and non IV drug use presenting with right 3rd and 4th finger swelling and erythema due to Staph Aureus abscess formation and associated cellulitis.     #) Sepsis secondary to Staph Aureus Abscess and associated cellulitis   -Leukocytosis 21.35, Tachycardic to 120s, +'ve source  -Possibly secondary to a bite of unknown origin   -Rt Hand XR wnl   -s/p I and D, 1 gram Vancomycin   -Blood cultures neg x 2, +'ve wound culture for Staph Aureus   -Cont IVF  -ID consult   -Ancef 1 gram q8h    #) Hypokalemia  -potassium 20 mEq x 3 doses    #) Gonococcal Infection  -with dysuria   -negative for Chlamydia   -s/p Ceftriaxone 1 gram IV (in ED) and Azithromycin 1 gram PO due to antimicrobial resistance    #) Herpes Labialis   -Acyclovir 400 mg 3 times daily for 5 to 10 days    #) EtOH Use Disorder  -Drinks up to 16 ounces of liquor daily  -Symptom triggered CIWA protocol     #) History of Polysubstance abuse  -admits to using IV Heroin in past (8 years ago)  -uses cocaine and Ecstasy, not sure if mixed with Heroine or other agents   -counseled on cessation, needs rehab    DVT ppx- Lovenox    GI ppx- not indicated    Dispo: Home when stable 28 year old female with a PMHx of  EtOH and non IV drug use presenting with right 3rd and 4th finger swelling and erythema due to Staph Aureus abscess formation and associated cellulitis.     #) Sepsis secondary to Staph Aureus Abscess and associated cellulitis   -Leukocytosis 21.35, Tachycardic to 120s, +'ve source  -Possibly secondary to a bite of unknown origin   -Rt Hand XR wnl   -s/p I and D, 1 gram Vancomycin   -Blood cultures neg x 2, +'ve wound culture for Staph Aureus   -Cont IVF  -ID consult   -Ancef 1 gram q8h    #) Hypokalemia  -potassium 20 mEq x 3 doses    #) Gonococcal Infection  -with dysuria   -negative for Chlamydia   -s/p Ceftriaxone 1 gram IV (in ED) and Azithromycin 1 gram PO due to antimicrobial resistance    #) Herpes Labialis   -Acyclovir 400 mg 3 times daily for 5 to 10 days    #) EtOH Use Disorder  -Drinks up to 16 ounces of liquor daily  -Symptom triggered CIWA protocol     #) History of Polysubstance abuse  -admits to using IV Heroin in past (8 years ago)  -uses cocaine and Ecstasy, not sure if mixed with Heroine or other agents   -counseled on cessation, needs rehab  -Pain control with Tylenol, Motrin, avoid Opiates     DVT ppx- Lovenox    GI ppx- not indicated    Dispo: Home when stable

## 2019-06-05 LAB
ANION GAP SERPL CALC-SCNC: 13 MMOL/L — SIGNIFICANT CHANGE UP (ref 7–14)
BASOPHILS # BLD AUTO: 0.06 K/UL — SIGNIFICANT CHANGE UP (ref 0–0.2)
BASOPHILS NFR BLD AUTO: 0.4 % — SIGNIFICANT CHANGE UP (ref 0–1)
BUN SERPL-MCNC: 6 MG/DL — LOW (ref 10–20)
CALCIUM SERPL-MCNC: 9 MG/DL — SIGNIFICANT CHANGE UP (ref 8.5–10.1)
CHLORIDE SERPL-SCNC: 103 MMOL/L — SIGNIFICANT CHANGE UP (ref 98–110)
CO2 SERPL-SCNC: 22 MMOL/L — SIGNIFICANT CHANGE UP (ref 17–32)
CREAT SERPL-MCNC: 0.7 MG/DL — SIGNIFICANT CHANGE UP (ref 0.7–1.5)
EOSINOPHIL # BLD AUTO: 0.7 K/UL — SIGNIFICANT CHANGE UP (ref 0–0.7)
EOSINOPHIL NFR BLD AUTO: 4.5 % — SIGNIFICANT CHANGE UP (ref 0–8)
GLUCOSE SERPL-MCNC: 100 MG/DL — HIGH (ref 70–99)
HCT VFR BLD CALC: 36.4 % — LOW (ref 37–47)
HGB BLD-MCNC: 12.1 G/DL — SIGNIFICANT CHANGE UP (ref 12–16)
IMM GRANULOCYTES NFR BLD AUTO: 0.4 % — HIGH (ref 0.1–0.3)
LYMPHOCYTES # BLD AUTO: 22.8 % — SIGNIFICANT CHANGE UP (ref 20.5–51.1)
LYMPHOCYTES # BLD AUTO: 3.58 K/UL — HIGH (ref 1.2–3.4)
MCHC RBC-ENTMCNC: 28.4 PG — SIGNIFICANT CHANGE UP (ref 27–31)
MCHC RBC-ENTMCNC: 33.2 G/DL — SIGNIFICANT CHANGE UP (ref 32–37)
MCV RBC AUTO: 85.4 FL — SIGNIFICANT CHANGE UP (ref 81–99)
MONOCYTES # BLD AUTO: 1.09 K/UL — HIGH (ref 0.1–0.6)
MONOCYTES NFR BLD AUTO: 6.9 % — SIGNIFICANT CHANGE UP (ref 1.7–9.3)
NEUTROPHILS # BLD AUTO: 10.22 K/UL — HIGH (ref 1.4–6.5)
NEUTROPHILS NFR BLD AUTO: 65 % — SIGNIFICANT CHANGE UP (ref 42.2–75.2)
NRBC # BLD: 0 /100 WBCS — SIGNIFICANT CHANGE UP (ref 0–0)
PLATELET # BLD AUTO: 333 K/UL — SIGNIFICANT CHANGE UP (ref 130–400)
POTASSIUM SERPL-MCNC: 4.6 MMOL/L — SIGNIFICANT CHANGE UP (ref 3.5–5)
POTASSIUM SERPL-SCNC: 4.6 MMOL/L — SIGNIFICANT CHANGE UP (ref 3.5–5)
RBC # BLD: 4.26 M/UL — SIGNIFICANT CHANGE UP (ref 4.2–5.4)
RBC # FLD: 11.8 % — SIGNIFICANT CHANGE UP (ref 11.5–14.5)
SODIUM SERPL-SCNC: 138 MMOL/L — SIGNIFICANT CHANGE UP (ref 135–146)
WBC # BLD: 15.72 K/UL — HIGH (ref 4.8–10.8)
WBC # FLD AUTO: 15.72 K/UL — HIGH (ref 4.8–10.8)

## 2019-06-05 PROCEDURE — 99024 POSTOP FOLLOW-UP VISIT: CPT

## 2019-06-05 PROCEDURE — 99233 SBSQ HOSP IP/OBS HIGH 50: CPT

## 2019-06-05 PROCEDURE — 73201 CT UPPER EXTREMITY W/DYE: CPT | Mod: 26,RT

## 2019-06-05 RX ORDER — IBUPROFEN 200 MG
600 TABLET ORAL EVERY 6 HOURS
Refills: 0 | Status: DISCONTINUED | OUTPATIENT
Start: 2019-06-05 | End: 2019-06-06

## 2019-06-05 RX ORDER — OXYCODONE HYDROCHLORIDE 5 MG/1
5 TABLET ORAL EVERY 6 HOURS
Refills: 0 | Status: DISCONTINUED | OUTPATIENT
Start: 2019-06-05 | End: 2019-06-06

## 2019-06-05 RX ORDER — ACETAMINOPHEN 500 MG
650 TABLET ORAL EVERY 6 HOURS
Refills: 0 | Status: DISCONTINUED | OUTPATIENT
Start: 2019-06-05 | End: 2019-06-06

## 2019-06-05 RX ORDER — VANCOMYCIN HCL 1 G
750 VIAL (EA) INTRAVENOUS EVERY 12 HOURS
Refills: 0 | Status: DISCONTINUED | OUTPATIENT
Start: 2019-06-05 | End: 2019-06-07

## 2019-06-05 RX ORDER — MORPHINE SULFATE 50 MG/1
2 CAPSULE, EXTENDED RELEASE ORAL ONCE
Refills: 0 | Status: DISCONTINUED | OUTPATIENT
Start: 2019-06-05 | End: 2019-06-05

## 2019-06-05 RX ORDER — VANCOMYCIN HCL 1 G
1250 VIAL (EA) INTRAVENOUS ONCE
Refills: 0 | Status: COMPLETED | OUTPATIENT
Start: 2019-06-05 | End: 2019-06-05

## 2019-06-05 RX ADMIN — CEFTRIAXONE 100 GRAM(S): 500 INJECTION, POWDER, FOR SOLUTION INTRAMUSCULAR; INTRAVENOUS at 13:32

## 2019-06-05 RX ADMIN — MORPHINE SULFATE 2 MILLIGRAM(S): 50 CAPSULE, EXTENDED RELEASE ORAL at 19:50

## 2019-06-05 RX ADMIN — Medication 400 MILLIGRAM(S): at 14:25

## 2019-06-05 RX ADMIN — Medication 250 MILLIGRAM(S): at 19:25

## 2019-06-05 RX ADMIN — Medication 400 MILLIGRAM(S): at 14:13

## 2019-06-05 RX ADMIN — Medication 166.67 MILLIGRAM(S): at 18:19

## 2019-06-05 RX ADMIN — Medication 1 TABLET(S): at 14:13

## 2019-06-05 RX ADMIN — Medication 400 MILLIGRAM(S): at 14:55

## 2019-06-05 RX ADMIN — OXYCODONE HYDROCHLORIDE 5 MILLIGRAM(S): 5 TABLET ORAL at 20:27

## 2019-06-05 RX ADMIN — OXYCODONE HYDROCHLORIDE 5 MILLIGRAM(S): 5 TABLET ORAL at 20:28

## 2019-06-05 NOTE — PROVIDER CONTACT NOTE (OTHER) - SITUATION
Pt and family would like to review today's CT results with you.
Spoke with MD to inform that pt stated she wants a psych consult due to anxiety and taking xanex at home
Spoke with MD to inform that pt is stating she will rip the scab off of her wound due to pain and increasing pressure.

## 2019-06-05 NOTE — PROGRESS NOTE ADULT - SUBJECTIVE AND OBJECTIVE BOX
SUBJECTIVE:    Patient is a 28y old Female who presents with a chief complaint of Cellulitis (04 Jun 2019 14:18)    Currently admitted to medicine with the primary diagnosis of Flexor tenosynovitis of finger     Today is hospital day 1d.   OVERNIGHT EVENTS  Today, patient is    PAST MEDICAL & SURGICAL HISTORY  No pertinent past medical history  No significant past surgical history          ALLERGIES:  No Known Allergies    MEDICATIONS:  STANDING MEDICATIONS  acyclovir   Oral Tab/Cap 400 milliGRAM(s) Oral daily  cefTRIAXone   IVPB      cefTRIAXone   IVPB 2 Gram(s) IV Intermittent every 24 hours  chlorhexidine 4% Liquid 1 Application(s) Topical <User Schedule>  enoxaparin Injectable 40 milliGRAM(s) SubCutaneous every 24 hours  multivitamin 1 Tablet(s) Oral daily  sodium chloride 0.9%. 1000 milliLiter(s) IV Continuous <Continuous>    PRN MEDICATIONS  acetaminophen   Tablet .. 650 milliGRAM(s) Oral every 6 hours PRN  ibuprofen  Tablet. 400 milliGRAM(s) Oral three times a day PRN  LORazepam     Tablet 2 milliGRAM(s) Oral every 2 hours PRN    VITALS:   T(F): 97.9  HR: 73  BP: 112/75  RR: 18  SpO2: 97%          LABS:                        12.1   15.72 )-----------( 333      ( 05 Jun 2019 06:43 )             36.4     06-05    138  |  103  |  6<L>  ----------------------------<  100<H>  4.6   |  22  |  0.7    Ca    9.0      05 Jun 2019 06:43  Phos  2.7     06-04  Mg     2.3     06-04    TPro  6.4  /  Alb  3.6  /  TBili  <0.2  /  DBili  <0.2  /  AST  14  /  ALT  12  /  AlkPhos  71  06-04    PT/INR - ( 04 Jun 2019 00:20 )   PT: 13.10 sec;   INR: 1.14 ratio         PTT - ( 04 Jun 2019 00:20 )  PTT:30.6 sec          Culture - Abscess with Gram Stain (collected 02 Jun 2019 16:22)  Source: .Abscess Arm - Right  Preliminary Report (04 Jun 2019 19:41):    Moderate Methicillin resistant Staphylococcus aureus  Organism: Methicillin resistant Staphylococcus aureus (04 Jun 2019 19:40)  Organism: Methicillin resistant Staphylococcus aureus (04 Jun 2019 19:40)    Culture - Blood (collected 02 Jun 2019 14:13)  Source: .Blood Blood  Preliminary Report (03 Jun 2019 22:01):    No growth to date.    Culture - Blood (collected 02 Jun 2019 14:13)  Source: .Blood Blood  Preliminary Report (03 Jun 2019 22:01):    No growth to date.          RADIOLOGY:  < from: Xray Hand 3 Views, Right (06.02.19 @ 13:14) >  XAM:  XR HAND MIN 3 VIEWS RT            PROCEDURE DATE:  06/02/2019            INTERPRETATION:  Clinical History / Reason for exam: Hand pain.    Technique: 3 views of the right hand. Limited evaluation due to flexed   positioning.    Comparison: None.    Findings/  Impression:    No definite evidence of acute fracture or dislocation. Joint spaces are   grossly unremarkable. No inadvertent radiopaque foreign body.        < end of copied text >      PHYSICAL EXAM:  GEN: NAD  LUNGS: CTAB  HEART: s1s2 present RRR  ABD: soft nontender nondistended +BS  EXT: right 4th finger swelling and erythema, ulcerated.   NEURO: aao x3 SUBJECTIVE:    Patient is a 28y old Female who presents with a chief complaint of Cellulitis (04 Jun 2019 14:18)    Currently admitted to medicine with the primary diagnosis of Flexor tenosynovitis of finger     Today is hospital day 1d.   OVERNIGHT EVENTS no acute events   Today, patient resting in bed comfortably, nods her head when asked if the pain in right hand is better. Denies any cp or sob.    PAST MEDICAL & SURGICAL HISTORY  No pertinent past medical history  No significant past surgical history          ALLERGIES:  No Known Allergies    MEDICATIONS:  STANDING MEDICATIONS  acyclovir   Oral Tab/Cap 400 milliGRAM(s) Oral daily  cefTRIAXone   IVPB      cefTRIAXone   IVPB 2 Gram(s) IV Intermittent every 24 hours  chlorhexidine 4% Liquid 1 Application(s) Topical <User Schedule>  enoxaparin Injectable 40 milliGRAM(s) SubCutaneous every 24 hours  multivitamin 1 Tablet(s) Oral daily  sodium chloride 0.9%. 1000 milliLiter(s) IV Continuous <Continuous>    PRN MEDICATIONS  acetaminophen   Tablet .. 650 milliGRAM(s) Oral every 6 hours PRN  ibuprofen  Tablet. 400 milliGRAM(s) Oral three times a day PRN  LORazepam     Tablet 2 milliGRAM(s) Oral every 2 hours PRN    VITALS:   T(F): 97.9  HR: 73  BP: 112/75  RR: 18  SpO2: 97%          LABS:                        12.1   15.72 )-----------( 333      ( 05 Jun 2019 06:43 )             36.4     06-05    138  |  103  |  6<L>  ----------------------------<  100<H>  4.6   |  22  |  0.7    Ca    9.0      05 Jun 2019 06:43  Phos  2.7     06-04  Mg     2.3     06-04    TPro  6.4  /  Alb  3.6  /  TBili  <0.2  /  DBili  <0.2  /  AST  14  /  ALT  12  /  AlkPhos  71  06-04    PT/INR - ( 04 Jun 2019 00:20 )   PT: 13.10 sec;   INR: 1.14 ratio         PTT - ( 04 Jun 2019 00:20 )  PTT:30.6 sec          Culture - Abscess with Gram Stain (collected 02 Jun 2019 16:22)  Source: .Abscess Arm - Right  Preliminary Report (04 Jun 2019 19:41):    Moderate Methicillin resistant Staphylococcus aureus  Organism: Methicillin resistant Staphylococcus aureus (04 Jun 2019 19:40)  Organism: Methicillin resistant Staphylococcus aureus (04 Jun 2019 19:40)    Culture - Blood (collected 02 Jun 2019 14:13)  Source: .Blood Blood  Preliminary Report (03 Jun 2019 22:01):    No growth to date.    Culture - Blood (collected 02 Jun 2019 14:13)  Source: .Blood Blood  Preliminary Report (03 Jun 2019 22:01):    No growth to date.          RADIOLOGY:  < from: Xray Hand 3 Views, Right (06.02.19 @ 13:14) >  XAM:  XR HAND MIN 3 VIEWS RT            PROCEDURE DATE:  06/02/2019            INTERPRETATION:  Clinical History / Reason for exam: Hand pain.    Technique: 3 views of the right hand. Limited evaluation due to flexed   positioning.    Comparison: None.    Findings/  Impression:    No definite evidence of acute fracture or dislocation. Joint spaces are   grossly unremarkable. No inadvertent radiopaque foreign body.        < end of copied text >      PHYSICAL EXAM:  GEN: NAD  LUNGS: CTAB  HEART: s1s2 present RRR  ABD: soft nontender nondistended +BS  EXT: right 4th finger swelling and erythema, ulcerated.   NEURO: aao x3

## 2019-06-05 NOTE — PROGRESS NOTE ADULT - ASSESSMENT
28 year old female with a PMHx of  EtOH and non IV drug use presenting with right 3rd and 4th finger swelling and erythema due to Staph Aureus abscess formation and associated cellulitis.     #) Sepsis secondary to Staph Aureus Abscess and associated cellulitis   -Leukocytosis 21.35, Tachycardic to 120s, +'ve source  -Possibly secondary to a bite of unknown origin   -Rt Hand XR wnl   -s/p I and D, 1 gram Vancomycin   -Blood cultures neg x 2, +'ve wound culture for Staph Aureus   -Cont IVF  -ID consult: Disseminated GC with arthritis dermatitis syndrome, Septic arthritis of right 3-4th MCP joint     --Rocephin 2 gm iv q24h     --NO SURGICAL DEBRIDEMENT     -- s/p Azithromycin 1 gm po x once    #) Gonococcal Infection   -GC positive   -negative for Chlamydia   -s/p Ceftriaxone 1 gram IV (in ED) and Azithromycin 1 gram PO due to antimicrobial resistance    #) Herpes Labialis   - Acyclovir 400 mg 3 times daily for 5 to 10 days (started 6/4/19)     #) EtOH Use Disorder  -Drinks up to 16 ounces of liquor daily  -Symptom triggered CIWA protocol     #) History of Polysubstance abuse  -admits to using IV Heroin in past (8 years ago)  -uses cocaine and Ecstasy, not sure if mixed with Heroine or other agents   -counseled on cessation, needs rehab  -Pain control with Tylenol, Motrin, avoid Opiates     DVT ppx- Lovenox  GI ppx- not indicated  Dispo: Home when stable 28 year old female with a PMHx of  EtOH and non IV drug use presenting with right 3rd and 4th finger swelling and erythema due to Staph Aureus abscess formation and associated cellulitis.     #) Sepsis secondary to Staph Aureus Abscess and associated cellulitis   -Leukocytosis 21.35, Tachycardic to 120s, +'ve source  -Possibly secondary to a bite of unknown origin   -Rt Hand XR wnl   -s/p I and D, 1 gram Vancomycin   -Blood cultures neg x 2, +'ve wound culture for Staph Aureus   -Cont IVF  -ID consult: Disseminated GC with arthritis dermatitis syndrome, Septic arthritis of right 3-4th MCP joint     --Rocephin 2 gm iv q24h     --NO SURGICAL DEBRIDEMENT     -- s/p Azithromycin 1 gm po x once  - CT hand with IV contrast ordered     #) Gonococcal Infection   -GC positive   -negative for Chlamydia   -s/p Ceftriaxone 1 gram IV (in ED) and Azithromycin 1 gram PO due to antimicrobial resistance    #) Herpes Labialis   - Acyclovir 400 mg 3 times daily for 5 to 10 days (started 6/4/19)     #) EtOH Use Disorder  -Drinks up to 16 ounces of liquor daily  -Symptom triggered CIWA protocol     #) History of Polysubstance abuse  -admits to using IV Heroin in past (8 years ago)  -uses cocaine and Ecstasy, not sure if mixed with Heroine or other agents   -counseled on cessation, needs rehab  -Pain control with Tylenol, Motrin, avoid Opiates     DVT ppx- Lovenox  GI ppx- not indicated  Dispo: Home when stable

## 2019-06-05 NOTE — PROGRESS NOTE ADULT - SUBJECTIVE AND OBJECTIVE BOX
CHIEF COMPLAINT:    Patient is a 28y old  Female who presents with a chief complaint of cellulitis     INTERVAL HPI/OVERNIGHT EVENTS:    Patient seen and examined at bedside. No acute overnight events occurred.    ROS: Reports elbow stiffness and hand pain. All other systems are negative.    Vital Signs:    T(F): 97 (06-05-19 @ 15:53), Max: 97.9 (06-05-19 @ 07:12)  HR: 89 (06-05-19 @ 15:53) (73 - 89)  BP: 106/66 (06-05-19 @ 15:53) (106/66 - 118/78)  RR: 18 (06-05-19 @ 15:53) (18 - 18)  SpO2: 97% (06-05-19 @ 07:17) (97% - 97%)  I&O's Summary    04 Jun 2019 07:01  -  05 Jun 2019 07:00  --------------------------------------------------------  IN: 300 mL / OUT: 0 mL / NET: 300 mL    05 Jun 2019 07:01  -  05 Jun 2019 17:39  --------------------------------------------------------  IN: 0 mL / OUT: 600 mL / NET: -600 mL    PHYSICAL EXAM:  GENERAL:  NAD, refusing to answer most questions, engage in exam. Exam limited as pt declined  SKIN: Unable to fully assess. Has right second MCP erythema, swelling with scab on finger  HEENT: Atraumatic. Normocephalic. Anicteric  NECK:  No JVD.   PULMONARY: Declined  CVS: Declined  ABDOMEN/GI: declined  EXTREMITIES:  declined-could not assess elbow pain either  NEUROLOGIC:  declined  PSYCH: Alert & oriented x 3,     Consultant(s) Notes Reviewed:  [x ] YES  [ ] NO      LABS:                        12.1   15.72 )-----------( 333      ( 05 Jun 2019 06:43 )             36.4     06-05    138  |  103  |  6<L>  ----------------------------<  100<H>  4.6   |  22  |  0.7    Ca    9.0      05 Jun 2019 06:43  Phos  2.7     06-04  Mg     2.3     06-04    TPro  6.4  /  Alb  3.6  /  TBili  <0.2  /  DBili  <0.2  /  AST  14  /  ALT  12  /  AlkPhos  71  06-04    PT/INR - ( 04 Jun 2019 00:20 )   PT: 13.10 sec;   INR: 1.14 ratio         PTT - ( 04 Jun 2019 00:20 )  PTT:30.6 sec    Culture - Blood (collected 04 Jun 2019 06:46)  Source: .Blood None  Preliminary Report (05 Jun 2019 13:00):    No growth to date.        RADIOLOGY & ADDITIONAL TESTS:  CT hand pending    Medications:  Standing  acyclovir   Oral Tab/Cap 400 milliGRAM(s) Oral daily  chlorhexidine 4% Liquid 1 Application(s) Topical <User Schedule>  enoxaparin Injectable 40 milliGRAM(s) SubCutaneous every 24 hours  multivitamin 1 Tablet(s) Oral daily  sodium chloride 0.9%. 1000 milliLiter(s) IV Continuous <Continuous>  vancomycin  IVPB 750 milliGRAM(s) IV Intermittent every 12 hours  vancomycin  IVPB 1250 milliGRAM(s) IV Intermittent once    PRN Meds  acetaminophen   Tablet .. 650 milliGRAM(s) Oral every 6 hours PRN  ibuprofen  Tablet. 400 milliGRAM(s) Oral three times a day PRN  LORazepam     Tablet 2 milliGRAM(s) Oral every 2 hours PRN      Case discussed with resident  Care discussed with pt

## 2019-06-05 NOTE — CONSULT NOTE ADULT - ASSESSMENT
28f right hand dominant with right hand absces    Plan:  I&D of abscess - place penrose drain between incision to allow for drainage  continue abx as per ID recs - f/u culture  Hand elevation  BID hand soaks and dressing changes     Plan discussed with Dr. Schwarz
HIV NG  Chlamydia NG  BRODERICK GC positive  BCx 6/2 NG  WCx 6/2 Staph aureus  WBC 21.3 > 16.1    IMPRESSION:  Disseminated GC with arthritis dermatitis syndrome  Septic arthritis of right 3-4th MCP joint  No obvious evidence of cervicitis  No enthesitis    RECOMMENDATIONS  Rocephin 2 gm iv q24h  NO SURGICAL DEBRIDEMENT  Azithromycin 1 gm po x once

## 2019-06-05 NOTE — PROGRESS NOTE ADULT - SUBJECTIVE AND OBJECTIVE BOX
JORGE CUONG  28y, Female      OVERNIGHT EVENTS:    no fevers, still with pain right hand but reduced    VITALS:  T(F): 97, Max: 97.9 (06-05-19 @ 07:12)  HR: 89  BP: 106/66  RR: 18Vital Signs Last 24 Hrs  T(C): 36.1 (05 Jun 2019 15:53), Max: 36.6 (05 Jun 2019 07:12)  T(F): 97 (05 Jun 2019 15:53), Max: 97.9 (05 Jun 2019 07:12)  HR: 89 (05 Jun 2019 15:53) (73 - 89)  BP: 106/66 (05 Jun 2019 15:53) (106/66 - 118/78)  BP(mean): --  RR: 18 (05 Jun 2019 15:53) (18 - 18)  SpO2: 97% (05 Jun 2019 07:17) (97% - 97%)    TESTS & MEASUREMENTS:                        12.1   15.72 )-----------( 333      ( 05 Jun 2019 06:43 )             36.4     06-05    138  |  103  |  6<L>  ----------------------------<  100<H>  4.6   |  22  |  0.7    Ca    9.0      05 Jun 2019 06:43  Phos  2.7     06-04  Mg     2.3     06-04    TPro  6.4  /  Alb  3.6  /  TBili  <0.2  /  DBili  <0.2  /  AST  14  /  ALT  12  /  AlkPhos  71  06-04    LIVER FUNCTIONS - ( 04 Jun 2019 06:46 )  Alb: 3.6 g/dL / Pro: 6.4 g/dL / ALK PHOS: 71 U/L / ALT: 12 U/L / AST: 14 U/L / GGT: x             Culture - Blood (collected 06-04-19 @ 06:46)  Source: .Blood None  Preliminary Report (06-05-19 @ 13:00):    No growth to date.    Culture - Abscess with Gram Stain (collected 06-02-19 @ 16:22)  Source: .Abscess Arm - Right  Preliminary Report (06-04-19 @ 19:41):    Moderate Methicillin resistant Staphylococcus aureus  Organism: Methicillin resistant Staphylococcus aureus (06-04-19 @ 19:40)  Organism: Methicillin resistant Staphylococcus aureus (06-04-19 @ 19:40)      -  Ampicillin/Sulbactam: R 16/8      -  Cefazolin: R 16      -  Clindamycin: S 0.5      -  Daptomycin: S 1      -  Erythromycin: R >4      -  Gentamicin: S <=1 Should not be used as monotherapy      -  Linezolid: S 2      -  Oxacillin: R >2      -  Penicillin: R >8      -  RIF- Rifampin: S <=1 Should not be used as monotherapy      -  Tetra/Doxy: S <=1      -  Trimethoprim/Sulfamethoxazole: S <=0.5/9.5      -  Vancomycin: S 2      Method Type: ALETHA    Culture - Blood (collected 06-02-19 @ 14:13)  Source: .Blood Blood  Preliminary Report (06-03-19 @ 22:01):    No growth to date.    Culture - Blood (collected 06-02-19 @ 14:13)  Source: .Blood Blood  Preliminary Report (06-03-19 @ 22:01):    No growth to date.            RADIOLOGY & ADDITIONAL TESTS:    ANTIBIOTICS:  acyclovir   Oral Tab/Cap 400 milliGRAM(s) Oral daily  cefTRIAXone   IVPB      cefTRIAXone   IVPB 2 Gram(s) IV Intermittent every 24 hours

## 2019-06-05 NOTE — PROGRESS NOTE ADULT - ASSESSMENT
28 year old female with a PMHx of  EtOH and non IV drug use presenting with right 3rd and 4th finger swelling and erythema due to Staph Aureus abscess formation and associated cellulitis.     Sepsis   -sepsis present on admission-resolving  -likely due to disseminated gonoccocal infection vs staph infection  -CT hand improving  -c/w rocephin as per ID  -previous admission states pt had bite-denies this now  -Possibly secondary to a bite of unknown origin   -instructed patient to contact her sexual partnes but denies having ever been sexually active    Herpes Labialis   - Acyclovir 400 mg 3 times daily for 5 to 10 days (started 6/4/19)   - pt declining examination, denies any lesions    EtOH Use Disorder  -Drinks up to 16 ounces of liquor daily-will not discuss with me  -Symptom triggered CIWA protocol     History of Polysubstance abuse  -declines any drug use in her lifetime     DVT ppx- Lovenox  GI ppx- not indicated    #Progress Note Handoff:  Pending (specify):  clinical improvement  Family discussion: d/w pt at bedside-pt agrees to CT hand  Disposition: Home__x_/SNF___/Other________/Unknown at this time________

## 2019-06-05 NOTE — CONSULT NOTE ADULT - ATTENDING COMMENTS
29 yo RHD woman with history of poly-substance abuse with right 4th dorsal digit abscess and cellulitis was initially treated in ED with I&D with drainage.  she was admitted for IV abx but eloped prior to transfer to the hospital [facility]. Returned to hospital 2 days later for worsening hand pain.  no fevers. chills    Pt seen at bedside.  Pt refused exam, pt “wants to sleep. come back later”.  Dressing in place.    s/p right 3rd dorsal webspace abscess repeat I & D last night with significant relief of pain    per nursing, patient non-compliant with hand soaks.    ct hand reviewed    -c/w iv abx for MRSA coverage  -recommend Infectious Disease consult  -counseled soapy hand soaks TID with complete submersion of right hand to both patient and nursing  -hand elevation  -trend cbc  -ESR and CRP  -continue medical mgmt per primary team  -will return to exam pt

## 2019-06-05 NOTE — PROGRESS NOTE ADULT - ASSESSMENT
· Assessment		  HIV NG  Chlamydia NG  BRODERICK GC positive  BCx 6/2 NG  WCx 6/2 ORSA  WBC 21.3 > 16.1>15.1    IMPRESSION:  Disseminated GC with arthritis dermatitis syndrome  Septic arthritis of right 3-4th MCP joint. Posibly secondary to ORSA of GC  No obvious evidence of cervicitis  No enthesitis    RECOMMENDATIONS  CT hand pending. If has joint effusion then a diagnostic aspirate  Rocephin 2 gm iv q24h  Vancomycin 1250 mg x once then 750 mg iv q12h

## 2019-06-05 NOTE — CONSULT NOTE ADULT - SUBJECTIVE AND OBJECTIVE BOX
[facility] [id number]  28y Female    HPI:  28 year old female with a PMHx of  EtOH, polysubstance abuse with right 3rd and 4th finger swelling and erythema due to Staph Aureus abscess formation and associated cellulitis. Patient presented to the ED on 06/02 initially due to her symptoms that had been occurring for 3 days following  a bite from an unknown source. An I and D was performed of the dorsal right ring finger with drainage of purulent fluid which grew Staph Aureus. Her lab values were  benign other than leukocytosis to 17 and a positive test for gonorrhea. She was to be admitted and transferred to  but went outside to smoke a cigarette after the I and D, without returning.  The police were contacted and the patient was found and returned to the ED.  She explained at that time that she did not want to stay despite medical advice given by staff, her IV was removed and she left against medical advice.  She returned on 06/03 as her the pain in her hand increased and she grew concerned. In the ED she received Ceftriaxone and Vancomycin. hand team recalled for evaluation after CT showed persistent abscess      PAST MEDICAL & SURGICAL HISTORY:  No pertinent past medical history  No significant past surgical history        MEDICATIONS  (STANDING):  acetaminophen   Tablet .. 650 milliGRAM(s) Oral every 6 hours  acyclovir   Oral Tab/Cap 400 milliGRAM(s) Oral daily  chlorhexidine 4% Liquid 1 Application(s) Topical <User Schedule>  enoxaparin Injectable 40 milliGRAM(s) SubCutaneous every 24 hours  ibuprofen  Tablet. 600 milliGRAM(s) Oral every 6 hours  multivitamin 1 Tablet(s) Oral daily  sodium chloride 0.9%. 1000 milliLiter(s) (75 mL/Hr) IV Continuous <Continuous>  vancomycin  IVPB 750 milliGRAM(s) IV Intermittent every 12 hours    MEDICATIONS  (PRN):  LORazepam     Tablet 2 milliGRAM(s) Oral every 2 hours PRN Symptom-triggered 2 point increase in CIWA-Ar  oxyCODONE    IR 5 milliGRAM(s) Oral every 6 hours PRN Severe Pain (7 - 10)      Allergies    No Known Allergies    Intolerances        REVIEW OF SYSTEMS    [X] A ten-point review of systems was otherwise negative except as noted.  [ ] Due to altered mental status/intubation, subjective information were not able to be obtained from the patient. History was obtained, to the extent possible, from review of the chart and collateral sources of information.      Vital Signs Last 24 Hrs  T(C): 36.1 (05 Jun 2019 15:53), Max: 36.6 (05 Jun 2019 07:12)  T(F): 97 (05 Jun 2019 15:53), Max: 97.9 (05 Jun 2019 07:12)  HR: 89 (05 Jun 2019 15:53) (73 - 89)  BP: 106/66 (05 Jun 2019 15:53) (106/66 - 118/78)  BP(mean): --  RR: 18 (05 Jun 2019 15:53) (18 - 18)  SpO2: 97% (05 Jun 2019 07:17) (97% - 97%)    PHYSICAL EXAM:  GENERAL: NAD, well-appearing  CHEST/LUNG: Clear to auscultation bilaterally  HEART: Regular rate and rhythm  ABDOMEN: Soft, Nontender, Nondistended;   EXTREMITIES:  right hand dominant - right hand with fluctuant area between 3rd and 4th digit with erythema and cellulitis of dorsum of hand, decreased range of motion      LABS:  Labs:  CAPILLARY BLOOD GLUCOSE                              12.1   15.72 )-----------( 333      ( 05 Jun 2019 06:43 )             36.4       Auto Neutrophil %: 65.0 % (06-05-19 @ 06:43)  Auto Immature Granulocyte %: 0.4 % (06-05-19 @ 06:43)    06-05    138  |  103  |  6<L>  ----------------------------<  100<H>  4.6   |  22  |  0.7      Calcium, Total Serum: 9.0 mg/dL (06-05-19 @ 06:43)      LFTs:             6.4  | <0.2 | 14       ------------------[71      ( 04 Jun 2019 06:46 )  3.6  | <0.2 | 12          Lipase:x      Amylase:x         Blood Gas Venous - Lactate: 1.7 mmoL/L (06-03-19 @ 23:13)      Coags:     13.10  ----< 1.14    ( 04 Jun 2019 00:20 )     30.6            Culture - Blood (collected 04 Jun 2019 06:46)  Source: .Blood None  Preliminary Report (05 Jun 2019 13:00):    No growth to date.      RADIOLOGY & ADDITIONAL STUDIES:  < from: CT Hand w/ IV Cont, Right (06.05.19 @ 15:12) >  4.2 x 1.2 x 5.1 cm subcutaneous dorsal rim-enhancing abscess beginning at   the level of the third and fourth MCP joints and tracking along the   radial aspect of fourth proximal phalanx    Dorsal metacarpal region cellulitis    No radiopaque foreign body    < end of copied text >
CUONG PATEL  28y, Female  Allergy: No Known Allergies      HPI:  28 year old female with a PMHx of  EtOH, polysubstance abuse with right 3rd and 4th finger swelling and erythema due to Staph Aureus abscess formation and associated cellulitis. Patient presented to the ED on 06/02 initially due to her symptoms that had been occurring for 3 days following  a bite from an unknown source. An I and D was performed of the dorsal right ring finger with drainage of purulent fluid which grew Staph Aureus. Her lab values were  benign other than leukocytosis to 17 and a positive test for gonorrhea. She was to be admitted and transferred to  but went outside to smoke a cigarette after the I and D, without returning.  The police were contacted and the patient was found and returned to the ED.  She explained at that time that she did not want to stay despite medical advice given by staff, her IV was removed and she left against medical advice.  She returned on 06/03 as her the pain in her hand increased and she grew concerned. In the ED she received Ceftriaxone and Vancomycin. She denies chest pain, shortness of breath,  fever, chills and denies recent  IV drug use. (04 Jun 2019 03:34)  Sexually active a week before presentation, no condoms, had oral sex ( denies ejaculation in her mouth ), not sure if anal intercourse. No dyspareunia Pt was not very cooperative to my questions    FAMILY HISTORY:    PAST MEDICAL & SURGICAL HISTORY:  No pertinent past medical history  No significant past surgical history        VITALS:  T(F): 98, Max: 99.7 (06-03-19 @ 23:57)  HR: 95  BP: 114/76  RR: 18Vital Signs Last 24 Hrs  T(C): 36.7 (04 Jun 2019 08:37), Max: 37.6 (03 Jun 2019 23:57)  T(F): 98 (04 Jun 2019 08:37), Max: 99.7 (03 Jun 2019 23:57)  HR: 95 (04 Jun 2019 08:37) (95 - 133)  BP: 114/76 (04 Jun 2019 08:37) (114/76 - 124/84)  BP(mean): --  RR: 18 (04 Jun 2019 08:37) (18 - 20)  SpO2: 100% (03 Jun 2019 23:57) (99% - 100%)    TESTS & MEASUREMENTS:                        11.5   16.11 )-----------( 299      ( 04 Jun 2019 06:46 )             34.4     06-04    137  |  101  |  7<L>  ----------------------------<  107<H>  3.8   |  22  |  0.6<L>    Ca    8.0<L>      04 Jun 2019 06:46  Phos  2.7     06-04  Mg     2.3     06-04    TPro  6.4  /  Alb  3.6  /  TBili  <0.2  /  DBili  <0.2  /  AST  14  /  ALT  12  /  AlkPhos  71  06-04    LIVER FUNCTIONS - ( 04 Jun 2019 06:46 )  Alb: 3.6 g/dL / Pro: 6.4 g/dL / ALK PHOS: 71 U/L / ALT: 12 U/L / AST: 14 U/L / GGT: x             Culture - Abscess with Gram Stain (collected 06-02-19 @ 16:22)  Source: .Abscess Arm - Right  Preliminary Report (06-03-19 @ 16:09):    Moderate Staphylococcus aureus    Culture - Blood (collected 06-02-19 @ 14:13)  Source: .Blood Blood  Preliminary Report (06-03-19 @ 22:01):    No growth to date.    Culture - Blood (collected 06-02-19 @ 14:13)  Source: .Blood Blood  Preliminary Report (06-03-19 @ 22:01):    No growth to date.            RADIOLOGY & ADDITIONAL TESTS:    ANTIBIOTICS:  acyclovir   Oral Tab/Cap 400 milliGRAM(s) Oral daily  cefTRIAXone   IVPB      cefTRIAXone   IVPB 2 Gram(s) IV Intermittent once

## 2019-06-05 NOTE — PROCEDURE NOTE - NSPOSTCAREGUIDE_GEN_A_CORE
Verbal/written post procedure instructions were given to patient/caregiver/Keep the cast/splint/dressing clean and dry/Instructed patient/caregiver regarding signs and symptoms of infection/Instructed patient/caregiver to follow-up with primary care physician
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
## 2019-06-06 DIAGNOSIS — F17.210 NICOTINE DEPENDENCE, CIGARETTES, UNCOMPLICATED: ICD-10-CM

## 2019-06-06 DIAGNOSIS — L02.511 CUTANEOUS ABSCESS OF RIGHT HAND: ICD-10-CM

## 2019-06-06 DIAGNOSIS — L03.011 CELLULITIS OF RIGHT FINGER: ICD-10-CM

## 2019-06-06 DIAGNOSIS — F12.10 CANNABIS ABUSE, UNCOMPLICATED: ICD-10-CM

## 2019-06-06 DIAGNOSIS — F14.10 COCAINE ABUSE, UNCOMPLICATED: ICD-10-CM
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
## 2019-06-06 DIAGNOSIS — F16.10 HALLUCINOGEN ABUSE, UNCOMPLICATED: ICD-10-CM

## 2019-06-06 DIAGNOSIS — Z91.19 PATIENT'S NONCOMPLIANCE WITH OTHER MEDICAL TREATMENT AND REGIMEN: ICD-10-CM

## 2019-06-06 DIAGNOSIS — S60.464A: ICD-10-CM

## 2019-06-06 LAB
ALBUMIN SERPL ELPH-MCNC: 3.2 G/DL — LOW (ref 3.5–5.2)
ALP SERPL-CCNC: 59 U/L — SIGNIFICANT CHANGE UP (ref 30–115)
ALT FLD-CCNC: 10 U/L — SIGNIFICANT CHANGE UP (ref 0–41)
ANION GAP SERPL CALC-SCNC: 12 MMOL/L — SIGNIFICANT CHANGE UP (ref 7–14)
AST SERPL-CCNC: 10 U/L — SIGNIFICANT CHANGE UP (ref 0–41)
BASOPHILS # BLD AUTO: 0.05 K/UL — SIGNIFICANT CHANGE UP (ref 0–0.2)
BASOPHILS NFR BLD AUTO: 0.4 % — SIGNIFICANT CHANGE UP (ref 0–1)
BILIRUB SERPL-MCNC: <0.2 MG/DL — SIGNIFICANT CHANGE UP (ref 0.2–1.2)
BUN SERPL-MCNC: 12 MG/DL — SIGNIFICANT CHANGE UP (ref 10–20)
CALCIUM SERPL-MCNC: 8.6 MG/DL — SIGNIFICANT CHANGE UP (ref 8.5–10.1)
CHLORIDE SERPL-SCNC: 104 MMOL/L — SIGNIFICANT CHANGE UP (ref 98–110)
CO2 SERPL-SCNC: 22 MMOL/L — SIGNIFICANT CHANGE UP (ref 17–32)
CREAT SERPL-MCNC: 0.6 MG/DL — LOW (ref 0.7–1.5)
EOSINOPHIL # BLD AUTO: 0.68 K/UL — SIGNIFICANT CHANGE UP (ref 0–0.7)
EOSINOPHIL NFR BLD AUTO: 5.1 % — SIGNIFICANT CHANGE UP (ref 0–8)
GLUCOSE SERPL-MCNC: 103 MG/DL — HIGH (ref 70–99)
HCT VFR BLD CALC: 35.6 % — LOW (ref 37–47)
HGB BLD-MCNC: 11.7 G/DL — LOW (ref 12–16)
IMM GRANULOCYTES NFR BLD AUTO: 0.6 % — HIGH (ref 0.1–0.3)
LYMPHOCYTES # BLD AUTO: 31 % — SIGNIFICANT CHANGE UP (ref 20.5–51.1)
LYMPHOCYTES # BLD AUTO: 4.1 K/UL — HIGH (ref 1.2–3.4)
MCHC RBC-ENTMCNC: 28.4 PG — SIGNIFICANT CHANGE UP (ref 27–31)
MCHC RBC-ENTMCNC: 32.9 G/DL — SIGNIFICANT CHANGE UP (ref 32–37)
MCV RBC AUTO: 86.4 FL — SIGNIFICANT CHANGE UP (ref 81–99)
MONOCYTES # BLD AUTO: 0.96 K/UL — HIGH (ref 0.1–0.6)
MONOCYTES NFR BLD AUTO: 7.3 % — SIGNIFICANT CHANGE UP (ref 1.7–9.3)
NEUTROPHILS # BLD AUTO: 7.36 K/UL — HIGH (ref 1.4–6.5)
NEUTROPHILS NFR BLD AUTO: 55.6 % — SIGNIFICANT CHANGE UP (ref 42.2–75.2)
NRBC # BLD: 0 /100 WBCS — SIGNIFICANT CHANGE UP (ref 0–0)
PLATELET # BLD AUTO: 355 K/UL — SIGNIFICANT CHANGE UP (ref 130–400)
POTASSIUM SERPL-MCNC: 4.6 MMOL/L — SIGNIFICANT CHANGE UP (ref 3.5–5)
POTASSIUM SERPL-SCNC: 4.6 MMOL/L — SIGNIFICANT CHANGE UP (ref 3.5–5)
PROT SERPL-MCNC: 6.1 G/DL — SIGNIFICANT CHANGE UP (ref 6–8)
RBC # BLD: 4.12 M/UL — LOW (ref 4.2–5.4)
RBC # FLD: 11.8 % — SIGNIFICANT CHANGE UP (ref 11.5–14.5)
SODIUM SERPL-SCNC: 138 MMOL/L — SIGNIFICANT CHANGE UP (ref 135–146)
WBC # BLD: 13.23 K/UL — HIGH (ref 4.8–10.8)
WBC # FLD AUTO: 13.23 K/UL — HIGH (ref 4.8–10.8)

## 2019-06-06 PROCEDURE — 99233 SBSQ HOSP IP/OBS HIGH 50: CPT

## 2019-06-06 RX ORDER — ACETAMINOPHEN 500 MG
650 TABLET ORAL EVERY 6 HOURS
Refills: 0 | Status: DISCONTINUED | OUTPATIENT
Start: 2019-06-06 | End: 2019-06-07

## 2019-06-06 RX ORDER — IBUPROFEN 200 MG
600 TABLET ORAL EVERY 6 HOURS
Refills: 0 | Status: DISCONTINUED | OUTPATIENT
Start: 2019-06-06 | End: 2019-06-07

## 2019-06-06 RX ORDER — CEFTRIAXONE 500 MG/1
2 INJECTION, POWDER, FOR SOLUTION INTRAMUSCULAR; INTRAVENOUS EVERY 24 HOURS
Refills: 0 | Status: DISCONTINUED | OUTPATIENT
Start: 2019-06-06 | End: 2019-06-07

## 2019-06-06 RX ADMIN — Medication 250 MILLIGRAM(S): at 05:09

## 2019-06-06 RX ADMIN — Medication 600 MILLIGRAM(S): at 00:03

## 2019-06-06 RX ADMIN — Medication 650 MILLIGRAM(S): at 00:03

## 2019-06-06 RX ADMIN — Medication 250 MILLIGRAM(S): at 17:13

## 2019-06-06 RX ADMIN — Medication 600 MILLIGRAM(S): at 05:11

## 2019-06-06 RX ADMIN — OXYCODONE HYDROCHLORIDE 5 MILLIGRAM(S): 5 TABLET ORAL at 02:42

## 2019-06-06 RX ADMIN — Medication 1 TABLET(S): at 12:56

## 2019-06-06 RX ADMIN — Medication 400 MILLIGRAM(S): at 12:56

## 2019-06-06 RX ADMIN — SODIUM CHLORIDE 75 MILLILITER(S): 9 INJECTION INTRAMUSCULAR; INTRAVENOUS; SUBCUTANEOUS at 05:11

## 2019-06-06 RX ADMIN — OXYCODONE HYDROCHLORIDE 5 MILLIGRAM(S): 5 TABLET ORAL at 03:30

## 2019-06-06 RX ADMIN — Medication 650 MILLIGRAM(S): at 05:11

## 2019-06-06 RX ADMIN — CEFTRIAXONE 100 GRAM(S): 500 INJECTION, POWDER, FOR SOLUTION INTRAMUSCULAR; INTRAVENOUS at 12:56

## 2019-06-06 NOTE — PROGRESS NOTE ADULT - SUBJECTIVE AND OBJECTIVE BOX
SUBJECTIVE:    Patient is a 28y old Female who presents with a chief complaint of Cellulitis (06 Jun 2019 06:38)    Currently admitted to medicine with the primary diagnosis of Flexor tenosynovitis of finger     Today is hospital day 2d.   OVERNIGHT EVENTS no acute events   Today, patient denies any cp, sob, fever, chills, n/v, but complains of pain in right hand. 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     PAST MEDICAL & SURGICAL HISTORY  No pertinent past medical history  No significant past surgical history          ALLERGIES:  No Known Allergies    MEDICATIONS:  STANDING MEDICATIONS  acyclovir   Oral Tab/Cap 400 milliGRAM(s) Oral daily  cefTRIAXone   IVPB 2 Gram(s) IV Intermittent every 24 hours  chlorhexidine 4% Liquid 1 Application(s) Topical <User Schedule>  enoxaparin Injectable 40 milliGRAM(s) SubCutaneous every 24 hours  multivitamin 1 Tablet(s) Oral daily  sodium chloride 0.9%. 1000 milliLiter(s) IV Continuous <Continuous>  vancomycin  IVPB 750 milliGRAM(s) IV Intermittent every 12 hours    PRN MEDICATIONS  acetaminophen   Tablet .. 650 milliGRAM(s) Oral every 6 hours PRN  ibuprofen  Tablet. 600 milliGRAM(s) Oral every 6 hours PRN  LORazepam     Tablet 2 milliGRAM(s) Oral every 2 hours PRN    VITALS:   T(F): 98.8  HR: 75  BP: 115/70  RR: 18  SpO2: --          LABS:                        11.7   13.23 )-----------( 355      ( 06 Jun 2019 05:19 )             35.6     06-06    138  |  104  |  12  ----------------------------<  103<H>  4.6   |  22  |  0.6<L>    Ca    8.6      06 Jun 2019 05:19    TPro  6.1  /  Alb  3.2<L>  /  TBili  <0.2  /  DBili  x   /  AST  10  /  ALT  10  /  AlkPhos  59  06-06              Culture - Blood (collected 04 Jun 2019 06:46)  Source: .Blood None  Preliminary Report (05 Jun 2019 13:00):    No growth to date.          RADIOLOGY:  < from: CT Hand w/ IV Cont, Right (06.05.19 @ 15:12) >  EXAM:  CT HAND ONLY IC RT            PROCEDURE DATE:  06/05/2019            INTERPRETATION:  Clinical History / Reason for exam: Right hand pain and   swelling, infection    Technique: Axial images are performed through the right hand from the   level of the distal radius through the fingertips utilizing 100 cc of   Optiray nonionic contrast. Sagittal and coronal reformats submitted.    Findings: There is a rim-enhancing abscess located dorsal to the third   and fourth MCP joints extending along the radial side of the fourth   proximal phalanx measuring approximately 4.2 x 1.2 x 5.1 cm (series 5   image 70, series 9 image 12-16). There is dorsal skin irregularity at the   distal third of fourth proximal phalanx (series 5 image 83). There isno   subcutaneous emphysema or foreign body.    There is no evidence of flexor or extensor tenosynovitis. Bony structures   demonstrate no fracture or bony erosion.    Impression:    4.2 x 1.2 x 5.1 cm subcutaneous dorsal rim-enhancing abscess beginning at   the level of the third and fourth MCP joints and tracking along the   radial aspect of fourth proximal phalanx    Dorsal metacarpal region cellulitis    No radiopaque foreign body    < end of copied text >      PHYSICAL EXAM:  GEN: NAD  LUNGS: CTAB  HEART: rrr s1s2 present   ABD: soft nontender nondistended +BS  EXT: no edema   NEURO: aao x3

## 2019-06-06 NOTE — DIETITIAN INITIAL EVALUATION ADULT. - SOURCE
Pt was sleeping at time of RD visit, woke pt up, however she was not in the mood to talk as she said she is trying to rest. Pt was also frustrated that she isn't getting pain meds and states that she will go and get it off the street, RN aware./patient

## 2019-06-06 NOTE — DIETITIAN INITIAL EVALUATION ADULT. - ENERGY NEEDS
Calories: 1844-0257 kcal/day (MSJ x 1.2-1.3 AF)  Protein: 49-59 gm/day (1-1.2 gm/kg CBW)  Fluid: 1 ml/kcal

## 2019-06-06 NOTE — DIETITIAN INITIAL EVALUATION ADULT. - PHYSICAL APPEARANCE
well nourished/alert and oriented. BMI: 18.0, IBW: 125#, UBW: ~108-110#, denies any recent wt loss. no edema noted. wound to R hand

## 2019-06-06 NOTE — DIETITIAN INITIAL EVALUATION ADULT. - ORAL INTAKE PTA
Pt reports good appetite/po intake and denies use of oral nutrition supplements, didn't elaborate on nutrition hx. NKFA./good

## 2019-06-06 NOTE — PROGRESS NOTE ADULT - ASSESSMENT
· Assessment		  HIV NG  Chlamydia NG  BRODERICK GC positive  BCx 6/2, 4  NG  WCx 6/2 ORSA  WBC 21.3 > 16.1>15.1>13.2  6/5 :CT with abscess of ring finger with no OM/ septic arthritis/ tenosynovitis/ foreign body    IMPRESSION:  GC without dissemination. ( doubt that the finger lesion represents GC as it is isolated )  Subcutaneous abscess secondary to ORSA with no evidence of tenosynovitis/ OM/ septic arthritis        RECOMMENDATIONS  Rocephin 2 gm iv q24h  Vancomycin 750 mg iv q12h.  Vanco trough

## 2019-06-06 NOTE — PROGRESS NOTE ADULT - ASSESSMENT
28 year old female with a PMHx of  EtOH and non IV drug use presenting with right 3rd and 4th finger swelling and erythema due to Staph Aureus abscess formation and associated cellulitis.     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   - CT hand w/ iv contrast: 4.2 x 1.2 x 5.1 cm subcutaneous dorsal rim-enhancing abscess beginning at   the level of the third and fourth MCP joints and tracking along the   radial aspect of fourth proximal phalanx. Dorsal metacarpal region cellulitis.   - s/p I and D by hand surgery 6/5/19  - Hand surgery:  soapy hand soaks TID with complete submersion of right hand to both patient and nursing, hand elevation  - monitor cbc crp, esr   - Blood cultures neg x 2,  - abscess culture positive for MRSA, contact isolation   - Cont IVF  - ID consult: Vancomycin 750 q12h, Rocephin 2 gm iv q24h  - van trough tomorrow AM   - Pain control with tylenol and motrin, avoid opioids, add toradol as needed for severe pain     #) Gonococcal Infection   -GC positive   -negative for Chlamydia   -s/p Ceftriaxone 1 gram IV (in ED) and Azithromycin 1 gram PO due to antimicrobial resistance    #) Herpes Labialis   - Acyclovir 400 mg 3 times daily for 5 to 10 days (started 6/4/19)     #) EtOH Use Disorder  -Drinks up to 16 ounces of liquor daily  -Symptom triggered CIWA protocol     #) History of Polysubstance abuse  -admits to using IV Heroin in past (8 years ago)  -uses cocaine and Ecstasy, not sure if mixed with Heroine or other agents   -counseled on cessation, needs rehab  -Pain control with Tylenol, Motrin, avoid Opiates     DVT ppx- Lovenox  GI ppx- not indicated  Dispo: Home when stable

## 2019-06-06 NOTE — DIETITIAN INITIAL EVALUATION ADULT. - OTHER INFO
Pt p/w  right 3rd and 4th finger swelling and erythema due to Staph Aureus abscess formation and associated cellulitis. Blood cultures neg x 2, +'ve wound culture for Staph Aureus--continue IV fluids. No plan for surgical debridement. CT hand with IV contrast ordered. History of Polysubstance abuse--counseled on cessation, needs rehab. Reason for Assessment: low BMI

## 2019-06-06 NOTE — PROGRESS NOTE ADULT - SUBJECTIVE AND OBJECTIVE BOX
JORGE CUONG  28y, Female      OVERNIGHT EVENTS:    S/p debridement of abscess 6/5  no fevers, feels better, less pain right hand  no wrist pain  no pain in other joints     VITALS:  T(F): 98.6, Max: 98.6 (06-06-19 @ 04:00)  HR: 80  BP: 120/68  RR: 18Vital Signs Last 24 Hrs  T(C): 37 (06 Jun 2019 04:00), Max: 37 (06 Jun 2019 04:00)  T(F): 98.6 (06 Jun 2019 04:00), Max: 98.6 (06 Jun 2019 04:00)  HR: 80 (06 Jun 2019 04:00) (72 - 89)  BP: 120/68 (06 Jun 2019 04:00) (106/66 - 140/74)  BP(mean): --  RR: 18 (06 Jun 2019 04:00) (18 - 18)  SpO2: 97% (05 Jun 2019 07:17) (97% - 97%)    TESTS & MEASUREMENTS:                        11.7   13.23 )-----------( 355      ( 06 Jun 2019 05:19 )             35.6     06-05    138  |  103  |  6<L>  ----------------------------<  100<H>  4.6   |  22  |  0.7    Ca    9.0      05 Jun 2019 06:43  Phos  2.7     06-04  Mg     2.3     06-04    TPro  6.4  /  Alb  3.6  /  TBili  <0.2  /  DBili  <0.2  /  AST  14  /  ALT  12  /  AlkPhos  71  06-04    LIVER FUNCTIONS - ( 04 Jun 2019 06:46 )  Alb: 3.6 g/dL / Pro: 6.4 g/dL / ALK PHOS: 71 U/L / ALT: 12 U/L / AST: 14 U/L / GGT: x             Culture - Blood (collected 06-04-19 @ 06:46)  Source: .Blood None  Preliminary Report (06-05-19 @ 13:00):    No growth to date.    Culture - Abscess with Gram Stain (collected 06-02-19 @ 16:22)  Source: .Abscess Arm - Right  Preliminary Report (06-04-19 @ 19:41):    Moderate Methicillin resistant Staphylococcus aureus  Organism: Methicillin resistant Staphylococcus aureus (06-04-19 @ 19:40)  Organism: Methicillin resistant Staphylococcus aureus (06-04-19 @ 19:40)      -  Ampicillin/Sulbactam: R 16/8      -  Cefazolin: R 16      -  Clindamycin: S 0.5      -  Daptomycin: S 1      -  Erythromycin: R >4      -  Gentamicin: S <=1 Should not be used as monotherapy      -  Linezolid: S 2      -  Oxacillin: R >2      -  Penicillin: R >8      -  RIF- Rifampin: S <=1 Should not be used as monotherapy      -  Tetra/Doxy: S <=1      -  Trimethoprim/Sulfamethoxazole: S <=0.5/9.5      -  Vancomycin: S 2      Method Type: ALETHA    Culture - Blood (collected 06-02-19 @ 14:13)  Source: .Blood Blood  Preliminary Report (06-03-19 @ 22:01):    No growth to date.    Culture - Blood (collected 06-02-19 @ 14:13)  Source: .Blood Blood  Preliminary Report (06-03-19 @ 22:01):    No growth to date.            RADIOLOGY & ADDITIONAL TESTS:    ANTIBIOTICS:  acyclovir   Oral Tab/Cap 400 milliGRAM(s) Oral daily  vancomycin  IVPB 750 milliGRAM(s) IV Intermittent every 12 hours

## 2019-06-06 NOTE — DIETITIAN INITIAL EVALUATION ADULT. - FEEDING SKILL
independent/Pt reports that everything is fine and that she is eating most of her meals. Per RN, pt eats well.

## 2019-06-07 ENCOUNTER — TRANSCRIPTION ENCOUNTER (OUTPATIENT)
Age: 29
End: 2019-06-07

## 2019-06-07 VITALS
SYSTOLIC BLOOD PRESSURE: 109 MMHG | DIASTOLIC BLOOD PRESSURE: 69 MMHG | RESPIRATION RATE: 18 BRPM | HEART RATE: 76 BPM | TEMPERATURE: 98 F

## 2019-06-07 LAB
ANION GAP SERPL CALC-SCNC: 12 MMOL/L — SIGNIFICANT CHANGE UP (ref 7–14)
BASOPHILS # BLD AUTO: 0.05 K/UL — SIGNIFICANT CHANGE UP (ref 0–0.2)
BASOPHILS NFR BLD AUTO: 0.4 % — SIGNIFICANT CHANGE UP (ref 0–1)
BUN SERPL-MCNC: 11 MG/DL — SIGNIFICANT CHANGE UP (ref 10–20)
CALCIUM SERPL-MCNC: 9.3 MG/DL — SIGNIFICANT CHANGE UP (ref 8.5–10.1)
CHLORIDE SERPL-SCNC: 100 MMOL/L — SIGNIFICANT CHANGE UP (ref 98–110)
CO2 SERPL-SCNC: 24 MMOL/L — SIGNIFICANT CHANGE UP (ref 17–32)
CREAT SERPL-MCNC: 0.7 MG/DL — SIGNIFICANT CHANGE UP (ref 0.7–1.5)
CRP SERPL-MCNC: 0.51 MG/DL — HIGH (ref 0–0.4)
CULTURE RESULTS: SIGNIFICANT CHANGE UP
EOSINOPHIL # BLD AUTO: 0.72 K/UL — HIGH (ref 0–0.7)
EOSINOPHIL NFR BLD AUTO: 5.8 % — SIGNIFICANT CHANGE UP (ref 0–8)
ERYTHROCYTE [SEDIMENTATION RATE] IN BLOOD: 33 MM/HR — HIGH (ref 0–15)
GLUCOSE SERPL-MCNC: 117 MG/DL — HIGH (ref 70–99)
HCT VFR BLD CALC: 38 % — SIGNIFICANT CHANGE UP (ref 37–47)
HGB BLD-MCNC: 12.7 G/DL — SIGNIFICANT CHANGE UP (ref 12–16)
IMM GRANULOCYTES NFR BLD AUTO: 0.6 % — HIGH (ref 0.1–0.3)
LYMPHOCYTES # BLD AUTO: 3.78 K/UL — HIGH (ref 1.2–3.4)
LYMPHOCYTES # BLD AUTO: 30.6 % — SIGNIFICANT CHANGE UP (ref 20.5–51.1)
MCHC RBC-ENTMCNC: 28.6 PG — SIGNIFICANT CHANGE UP (ref 27–31)
MCHC RBC-ENTMCNC: 33.4 G/DL — SIGNIFICANT CHANGE UP (ref 32–37)
MCV RBC AUTO: 85.6 FL — SIGNIFICANT CHANGE UP (ref 81–99)
MONOCYTES # BLD AUTO: 0.71 K/UL — HIGH (ref 0.1–0.6)
MONOCYTES NFR BLD AUTO: 5.8 % — SIGNIFICANT CHANGE UP (ref 1.7–9.3)
NEUTROPHILS # BLD AUTO: 7.01 K/UL — HIGH (ref 1.4–6.5)
NEUTROPHILS NFR BLD AUTO: 56.8 % — SIGNIFICANT CHANGE UP (ref 42.2–75.2)
NRBC # BLD: 0 /100 WBCS — SIGNIFICANT CHANGE UP (ref 0–0)
ORGANISM # SPEC MICROSCOPIC CNT: SIGNIFICANT CHANGE UP
ORGANISM # SPEC MICROSCOPIC CNT: SIGNIFICANT CHANGE UP
PLATELET # BLD AUTO: 391 K/UL — SIGNIFICANT CHANGE UP (ref 130–400)
POTASSIUM SERPL-MCNC: 4.4 MMOL/L — SIGNIFICANT CHANGE UP (ref 3.5–5)
POTASSIUM SERPL-SCNC: 4.4 MMOL/L — SIGNIFICANT CHANGE UP (ref 3.5–5)
RBC # BLD: 4.44 M/UL — SIGNIFICANT CHANGE UP (ref 4.2–5.4)
RBC # FLD: 11.7 % — SIGNIFICANT CHANGE UP (ref 11.5–14.5)
SODIUM SERPL-SCNC: 136 MMOL/L — SIGNIFICANT CHANGE UP (ref 135–146)
SPECIMEN SOURCE: SIGNIFICANT CHANGE UP
WBC # BLD: 12.34 K/UL — HIGH (ref 4.8–10.8)
WBC # FLD AUTO: 12.34 K/UL — HIGH (ref 4.8–10.8)

## 2019-06-07 PROCEDURE — 99024 POSTOP FOLLOW-UP VISIT: CPT

## 2019-06-07 PROCEDURE — 99238 HOSP IP/OBS DSCHRG MGMT 30/<: CPT

## 2019-06-07 RX ORDER — ACYCLOVIR SODIUM 500 MG
1 VIAL (EA) INTRAVENOUS
Qty: 6 | Refills: 0
Start: 2019-06-07 | End: 2019-06-12

## 2019-06-07 RX ORDER — ACETAMINOPHEN 500 MG
2 TABLET ORAL
Qty: 0 | Refills: 0 | DISCHARGE
Start: 2019-06-07

## 2019-06-07 RX ADMIN — Medication 400 MILLIGRAM(S): at 12:51

## 2019-06-07 RX ADMIN — Medication 1 TABLET(S): at 12:51

## 2019-06-07 RX ADMIN — Medication 250 MILLIGRAM(S): at 05:04

## 2019-06-07 NOTE — DISCHARGE NOTE PROVIDER - CARE PROVIDER_API CALL
Angella Murphy)  Internal Medicine  [address] [place] [address]  Phone: [phone]  Fax: [phone]  Follow Up Time: Angella Murphy)  Internal Medicine  [address] [place] [address]  Phone: [phone]  Fax: [phone]  Follow Up Time:     Carmel Schwarz)  [facility] Phys[facility]  [address] [place] [address], [address]  [place] [address]  Phone: [phone]  Fax: [phone]  Follow Up Time:

## 2019-06-07 NOTE — PROGRESS NOTE ADULT - SUBJECTIVE AND OBJECTIVE BOX
PLASTIC SURGERY PROGRESS NOTE     CUONG PATEL  [address] [place] [address] day :3d  Surgical Attending: Chong 	  Overnight events: No acute events overnight. Remained afebrile. Getting TID soaks, hand exercises. WBC decreasing since admission, AM labs pending. On CTX, vanc, for ORSA    T(F): 98.2 (06-07-19 @ 00:23), Max: 98.8 (06-06-19 @ 08:11)  HR: 76 (06-07-19 @ 00:23) (75 - 87)  BP: 109/69 (06-07-19 @ 00:23) (109/69 - 115/70)  ABP: --  ABP(mean): --  RR: 18 (06-07-19 @ 00:23) (18 - 18)  SpO2: --      06-05-19 @ 07:01  -  06-06-19 @ 07:00  --------------------------------------------------------  IN:  Total IN: 0 mL    OUT:    Voided: 600 mL  Total OUT: 600 mL    Total NET: -600 mL      06-06-19 @ 07:01  -  06-07-19 @ 05:40  --------------------------------------------------------  IN:    IV PiggyBack: 50 mL    sodium chloride 0.9%.: 525 mL  Total IN: 575 mL    OUT:  Total OUT: 0 mL    Total NET: 575 mL        DIET/FLUIDS: multivitamin 1 Tablet(s) Oral daily  sodium chloride 0.9%. 1000 milliLiter(s) IV Continuous <Continuous>       GI proph:    AC/ proph:   ABx: acyclovir   Oral Tab/Cap 400 milliGRAM(s) Oral daily  cefTRIAXone   IVPB 2 Gram(s) IV Intermittent every 24 hours  vancomycin  IVPB 750 milliGRAM(s) IV Intermittent every 12 hours      PHYSICAL EXAM:  GENERAL: NAD  CHEST/LUNG: Clear to auscultation bilaterally  HEART: Regular rate and rhythm  ABDOMEN: Soft, Nontender, Nondistended;   EXTREMITIES: r hand elevated on posey block, penrose drain in incision, wilfredo-incisional erythema, some pain on active flexion      LABS  Labs:  CAPILLARY BLOOD GLUCOSE                              11.7   13.23 )-----------( 355      ( 06 Jun 2019 05:19 )             35.6         06-06    138  |  104  |  12  ----------------------------<  103<H>  4.6   |  22  |  0.6<L>          LFTs:             6.1  | <0.2 | 10       ------------------[59      ( 06 Jun 2019 05:19 )  3.2  | x    | 10            Culture - Blood (collected 04 Jun 2019 06:46)  Source: .Blood None  Preliminary Report (05 Jun 2019 13:00):    No growth to date.

## 2019-06-07 NOTE — PROGRESS NOTE ADULT - SUBJECTIVE AND OBJECTIVE BOX
SUBJECTIVE:    Patient is a 28y old Female who presents with a chief complaint of Cellulitis (07 Jun 2019 05:39)    Currently admitted to medicine with the primary diagnosis of Flexor tenosynovitis of finger     Today is hospital day 3d.   OVERNIGHT EVENTS no acute events   Today, patient is walking outside, complains of pain but refuse to try taking tylenol for pain control.    PAST MEDICAL & SURGICAL HISTORY  No pertinent past medical history  No significant past surgical history          ALLERGIES:  No Known Allergies    MEDICATIONS:  STANDING MEDICATIONS  acyclovir   Oral Tab/Cap 400 milliGRAM(s) Oral daily  cefTRIAXone   IVPB 2 Gram(s) IV Intermittent every 24 hours  chlorhexidine 4% Liquid 1 Application(s) Topical <User Schedule>  enoxaparin Injectable 40 milliGRAM(s) SubCutaneous every 24 hours  multivitamin 1 Tablet(s) Oral daily  sodium chloride 0.9%. 1000 milliLiter(s) IV Continuous <Continuous>  vancomycin  IVPB 750 milliGRAM(s) IV Intermittent every 12 hours    PRN MEDICATIONS  acetaminophen   Tablet .. 650 milliGRAM(s) Oral every 6 hours PRN  ibuprofen  Tablet. 600 milliGRAM(s) Oral every 6 hours PRN  LORazepam     Tablet 2 milliGRAM(s) Oral every 2 hours PRN    VITALS:   T(F): 98.2  HR: 76  BP: 109/69  RR: 18  SpO2: --          LABS:                        12.7   12.34 )-----------( 391      ( 07 Jun 2019 05:56 )             38.0     06-07    136  |  100  |  11  ----------------------------<  117<H>  4.4   |  24  |  0.7    Ca    9.3      07 Jun 2019 05:56    TPro  6.1  /  Alb  3.2<L>  /  TBili  <0.2  /  DBili  x   /  AST  10  /  ALT  10  /  AlkPhos  59  06-06          Sedimentation Rate, Erythrocyte: 33 mm/hr <H> (06-07-19 @ 05:56)      Culture - Abscess with Gram Stain (collected 05 Jun 2019 20:10)  Source: .Abscess Hand - right  Preliminary Report (07 Jun 2019 09:01):    Moderate Staphylococcus aureus        PHYSICAL EXAM:  GEN: NAD  LUNGS: CTAB  HEART: rrr s1s2 present   ABD: soft nontender nondistended +BS  EXT: right fourth finger redness/swelling improving    NEURO: aao x3

## 2019-06-07 NOTE — DISCHARGE NOTE NURSING/CASE MANAGEMENT/SOCIAL WORK - NSDCDPATPORTLINK_GEN_ALL_CORE
You can access the [facility] Portal, offered by [facility], by registering with the following website: [email or web address]

## 2019-06-07 NOTE — DISCHARGE NOTE PROVIDER - CARE PROVIDERS DIRECT ADDRESSES
,DirectAddress_Unknown ,DirectAddress_Unknown,[email or web address]

## 2019-06-07 NOTE — DISCHARGE NOTE PROVIDER - NSDCCPCAREPLAN_GEN_ALL_CORE_FT
PRINCIPAL DISCHARGE DIAGNOSIS  Diagnosis: Cellulitis and abscess  Assessment and Plan of Treatment: Please continue oral antibiotics bactrim as prescribed to complete 14 days. Follow up with Dr. Murphy (infectious disease) as outpatient in 2 weeks.   Continue wound care of right hand as follows:  -Soapy hand soaks three times a day with warm water and benito and benito baby wash, with complete submersion of right hand.         SECONDARY DISCHARGE DIAGNOSES  Diagnosis: Gonococcal infection  Assessment and Plan of Treatment: Please follow up with your primary care doctor and ob/gyn as outpatient in 2 weeks.    Diagnosis: Herpes labialis  Assessment and Plan of Treatment: Continue taking acyclovir as prescribed and follow up with your primary care doctor as outpatient in 2 weeks.    Diagnosis: Alcohol abuse  Assessment and Plan of Treatment: Please abstain from alcohol.    Diagnosis: IV drug abuse  Assessment and Plan of Treatment: Please stop using recreational drugs. Follow up with your primary care doctor as outpatient in 1-2 weeks. PRINCIPAL DISCHARGE DIAGNOSIS  Diagnosis: Cellulitis and abscess  Assessment and Plan of Treatment: Please continue oral antibiotics bactrim as prescribed to complete 14 days. Follow up with Dr. Murphy (infectious disease) as outpatient in 2 weeks.   Continue wound care of right hand as follows:  -Soapy hand soaks three times a day with warm water and benito and benito baby wash, with complete submersion of right hand.   -hand elevation with posey  -regular active flexion, extension of hand        SECONDARY DISCHARGE DIAGNOSES  Diagnosis: Gonococcal infection  Assessment and Plan of Treatment: Please follow up with your primary care doctor and ob/gyn as outpatient in 2 weeks.    Diagnosis: Herpes labialis  Assessment and Plan of Treatment: Continue taking acyclovir as prescribed and follow up with your primary care doctor as outpatient in 2 weeks.    Diagnosis: Alcohol abuse  Assessment and Plan of Treatment: Please abstain from alcohol.    Diagnosis: IV drug abuse  Assessment and Plan of Treatment: Please stop using recreational drugs. Follow up with your primary care doctor as outpatient in 1-2 weeks. PRINCIPAL DISCHARGE DIAGNOSIS  Diagnosis: Cellulitis and abscess  Assessment and Plan of Treatment: Please continue oral antibiotics bactrim as prescribed to complete 14 days. Follow up with Dr. Murphy (infectious disease) as outpatient in 2 weeks.   Continue wound care of right hand as follows:  -Soapy hand soaks three times a day with warm water and benito and benito baby wash, with complete submersion of right hand.   -hand elevation with posey.  -regular active flexion, extension of hand.  Follow up with Surgery (Dr. Schwarz) next week for removal of penrose tube and new dressing change instructions. Please call and make an appointment.         SECONDARY DISCHARGE DIAGNOSES  Diagnosis: Gonococcal infection  Assessment and Plan of Treatment: Please follow up with your primary care doctor as outpatient in 2 weeks.    Diagnosis: Herpes labialis  Assessment and Plan of Treatment: Continue taking acyclovir as prescribed and follow up with your primary care doctor as outpatient in 2 weeks.    Diagnosis: Alcohol abuse  Assessment and Plan of Treatment: Please abstain from alcohol.    Diagnosis: IV drug abuse  Assessment and Plan of Treatment: Please do not use recreational drugs. Follow up with your primary care doctor as outpatient in 1-2 weeks. PRINCIPAL DISCHARGE DIAGNOSIS  Diagnosis: Cellulitis and abscess  Assessment and Plan of Treatment: Please continue oral antibiotics bactrim as prescribed to complete 14 days. Follow up with Dr. Murphy (infectious disease) as outpatient in 2 weeks.   Continue wound care of right hand as follows:  -Soapy hand soaks three times a day with warm water and benito and benito baby wash, with complete submersion of right hand.   -hand elevation with posey.  -regular active flexion, extension of hand.  Follow up with Surgery (Dr. Schwarz) next Tuesday 6/11/2019 at 10 AM for removal of penrose tube and new dressing change instructions.   Appointment:  Tuesday 6/11/2019 at 10 AM  (Dr. Schwarz)         SECONDARY DISCHARGE DIAGNOSES  Diagnosis: Gonococcal infection  Assessment and Plan of Treatment: Please follow up with your primary care doctor as outpatient in 2 weeks.    Diagnosis: Herpes labialis  Assessment and Plan of Treatment: Continue taking acyclovir as prescribed and follow up with your primary care doctor as outpatient in 2 weeks.    Diagnosis: Alcohol abuse  Assessment and Plan of Treatment: Please abstain from alcohol.    Diagnosis: IV drug abuse  Assessment and Plan of Treatment: Please do not use recreational drugs. Follow up with your primary care doctor as outpatient in 1-2 weeks.

## 2019-06-07 NOTE — CHART NOTE - NSCHARTNOTEFT_GEN_A_CORE
<<<RESIDENT DISCHARGE NOTE>>>     CUONG PATEL  MRN-[id number]    VITAL SIGNS:  T(F): 98.2 (06-07-19 @ 00:23), Max: 98.2 (06-07-19 @ 00:23)  HR: 76 (06-07-19 @ 00:23)  BP: 109/69 (06-07-19 @ 00:23)  SpO2: --      PHYSICAL EXAMINATION:  GEN: NAD  LUNGS: CTAB  HEART: rrr s1s2 present   ABD: soft nontender nondistended +BS  EXT: right hand wrapped in dressing     NEURO: aao x3    TEST RESULTS:                        12.7   12.34 )-----------( 391      ( 07 Jun 2019 05:56 )             38.0       06-07    136  |  100  |  11  ----------------------------<  117<H>  4.4   |  24  |  0.7    Ca    9.3      07 Jun 2019 05:56    TPro  6.1  /  Alb  3.2<L>  /  TBili  <0.2  /  DBili  x   /  AST  10  /  ALT  10  /  AlkPhos  59  06-06      FINAL DISCHARGE INTERVIEW:  Resident(s) Present: (Name:_______Sebas______), RN Present: (Name:  _____Laverne______)    DISCHARGE MEDICATION RECONCILIATION  reviewed with Attending (Name:_____Vishnu______)    DISPOSITION:   [ x ] Home,    [  ] Home with Visiting Nursing Services,   [    ]  SNF/ NH,    [   ] Acute Rehab (4A),   [   ] Other (Specify:_________)

## 2019-06-07 NOTE — PROGRESS NOTE ADULT - ASSESSMENT
28 year old female with a PMHx of  EtOH and non IV drug use presenting with right 3rd and 4th finger swelling and erythema due to Staph Aureus abscess formation and associated cellulitis.     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  - CT hand w/ iv contrast: 4.2 x 1.2 x 5.1 cm subcutaneous dorsal rim-enhancing abscess beginning at   the level of the third and fourth MCP joints and tracking along the   radial aspect of fourth proximal phalanx. Dorsal metacarpal region cellulitis.   - s/p I and D by hand surgery 6/5/19  - Hand surgery:  soapy hand soaks TID with complete submersion of right hand to both patient and nursing, hand elevation  - monitor cbc crp, esr   - Blood cultures neg x 2  - Pain control with tylenol and motrin, avoid opioids, add toradol as needed for severe pain  -- abscess culture positive for MRSA, sensitive to clinda, genta, doxy, bactrim  - ID follow up for antibiotics for d/c planning      #) Gonococcal Infection   -GC positive   -negative for Chlamydia   -s/p Ceftriaxone 1 gram IV (in ED) and Azithromycin 1 gram PO due to antimicrobial resistance    #) Herpes Labialis   - Acyclovir 400 mg 3 times daily for 5 to 10 days (started 6/4/19)     #) EtOH Use Disorder  -Drinks up to 16 ounces of liquor daily  -Symptom triggered CIWA protocol     #) History of Polysubstance abuse  -admits to using IV Heroin in past (8 years ago)  -uses cocaine and Ecstasy, not sure if mixed with Heroine or other agents   -counseled on cessation, needs rehab  -Pain control with Tylenol, Motrin, avoid Opiates     DVT ppx- Lovenox  GI ppx- not indicated  Dispo: Home when stable

## 2019-06-07 NOTE — PROGRESS NOTE ADULT - ASSESSMENT
A/P:  CUONG PATEL is a 28yFemale HD3 with right hand abscess s/p I&D    Plan:   -continue hand soaks TID  -hand elevation with posey  -regular active flexion, extension of hand  -continue abx

## 2019-06-07 NOTE — PROGRESS NOTE ADULT - EXTREMITIES COMMENTS
right ring finger with minimal edema/ erythema. Flexion of finger slightly restricted
right ring finger with reduced edema/ erythema/ packed
right hand with reduced edema/ erythema. No purulence . Necrotic eschar

## 2019-06-07 NOTE — PROGRESS NOTE ADULT - PROVIDER SPECIALTY LIST ADULT
Infectious Disease
Infectious Disease
Internal Medicine
Plastic Surgery
Infectious Disease

## 2019-06-07 NOTE — PROGRESS NOTE ADULT - ATTENDING COMMENTS
Pt seen and examined at bedside this morning~7:20 am with Surgery Hemanth    No acute events overnight.  Pt reports no hand soaks offered.  Nursing reports pt eloped off the floor again.  Given hand soak in early evening, and pt refused hand soak in PM.    AVSS  On Vanco/Rocephin per ID    Gen: NAD, non-cooperative  Right hand: normal tenodesis, improving dorsal erythema with mild tenderness, penrose in place, no purulent drainage on palpation, no acute pain of  AROM/PROM of MCP/PIP/DIPJ, no tenderness over flexor tendon sheath.    Labs: none were ordered for 6/7/19    A/P: Resolving right 3rd webspace and 4th dorsal finger subcutaneous abscess and cellulitis  NO EVIDENCE OF FLEXOR TENOSYNOVITIS OR SEPTIC ARTHRITIS    No further hand surgery intervention indicated  -c/w IV abx per ID  -c/w soapy hand soaks TID with ROM.  counseled importance of hand soaks with patient  -hand elevation  -repeat CBC  -if no leukocytosis, will remove penrose and start xeroform packing TID  -medical mgmt per primary team  -SW for poly substance abuse  -pain control non-opoid  -DVT ppx
#Progress Note Handoff  Pending (specify):  Consults_________, Tests________, Test Results_______, Other___IV abx, culture______  Family discussion: delonte pt   Disposition: Home_x__/SNF___/Other________/Unknown at this time________

## 2019-06-07 NOTE — DISCHARGE NOTE PROVIDER - PROVIDER TOKENS
PROVIDER:[TOKEN:[[id number]]] PROVIDER:[TOKEN:[[id number]]],PROVIDER:[TOKEN:[[id number]]]

## 2019-06-07 NOTE — DISCHARGE NOTE NURSING/CASE MANAGEMENT/SOCIAL WORK - NSDCPEWEB_GEN_ALL_CORE
[facility] for Tobacco Control website --- [email or web address] website --- [email or web address]

## 2019-06-07 NOTE — PROGRESS NOTE ADULT - SUBJECTIVE AND OBJECTIVE BOX
JORGE CUONG  28y, Female      OVERNIGHT EVENTS:    no fevers, feels better with less pain    VITALS:  T(F): 98.2, Max: 98.2 (06-07-19 @ 00:23)  HR: 76  BP: 109/69  RR: 18Vital Signs Last 24 Hrs  T(C): 36.8 (07 Jun 2019 00:23), Max: 36.8 (07 Jun 2019 00:23)  T(F): 98.2 (07 Jun 2019 00:23), Max: 98.2 (07 Jun 2019 00:23)  HR: 76 (07 Jun 2019 00:23) (76 - 87)  BP: 109/69 (07 Jun 2019 00:23) (109/69 - 113/74)  BP(mean): --  RR: 18 (07 Jun 2019 00:23) (18 - 18)  SpO2: --    TESTS & MEASUREMENTS:                        12.7   12.34 )-----------( 391      ( 07 Jun 2019 05:56 )             38.0     06-07    136  |  100  |  11  ----------------------------<  117<H>  4.4   |  24  |  0.7    Ca    9.3      07 Jun 2019 05:56    TPro  6.1  /  Alb  3.2<L>  /  TBili  <0.2  /  DBili  x   /  AST  10  /  ALT  10  /  AlkPhos  59  06-06    LIVER FUNCTIONS - ( 06 Jun 2019 05:19 )  Alb: 3.2 g/dL / Pro: 6.1 g/dL / ALK PHOS: 59 U/L / ALT: 10 U/L / AST: 10 U/L / GGT: x             Culture - Abscess with Gram Stain (collected 06-05-19 @ 20:10)  Source: .Abscess Hand - right  Preliminary Report (06-07-19 @ 09:01):    Moderate Staphylococcus aureus    Culture - Blood (collected 06-04-19 @ 06:46)  Source: .Blood None  Preliminary Report (06-05-19 @ 13:00):    No growth to date.    Culture - Abscess with Gram Stain (collected 06-02-19 @ 16:22)  Source: .Abscess Arm - Right  Preliminary Report (06-04-19 @ 19:41):    Moderate Methicillin resistant Staphylococcus aureus  Organism: Methicillin resistant Staphylococcus aureus (06-04-19 @ 19:40)  Organism: Methicillin resistant Staphylococcus aureus (06-04-19 @ 19:40)      -  Ampicillin/Sulbactam: R 16/8      -  Cefazolin: R 16      -  Clindamycin: S 0.5      -  Daptomycin: S 1      -  Erythromycin: R >4      -  Gentamicin: S <=1 Should not be used as monotherapy      -  Linezolid: S 2      -  Oxacillin: R >2      -  Penicillin: R >8      -  RIF- Rifampin: S <=1 Should not be used as monotherapy      -  Tetra/Doxy: S <=1      -  Trimethoprim/Sulfamethoxazole: S <=0.5/9.5      -  Vancomycin: S 2      Method Type: ALETHA    Culture - Blood (collected 06-02-19 @ 14:13)  Source: .Blood Blood  Preliminary Report (06-03-19 @ 22:01):    No growth to date.    Culture - Blood (collected 06-02-19 @ 14:13)  Source: .Blood Blood  Preliminary Report (06-03-19 @ 22:01):    No growth to date.            RADIOLOGY & ADDITIONAL TESTS:    ANTIBIOTICS:  acyclovir   Oral Tab/Cap 400 milliGRAM(s) Oral daily  cefTRIAXone   IVPB 2 Gram(s) IV Intermittent every 24 hours  vancomycin  IVPB 750 milliGRAM(s) IV Intermittent every 12 hours

## 2019-06-07 NOTE — DISCHARGE NOTE NURSING/CASE MANAGEMENT/SOCIAL WORK - NSDCPEEMAIL_GEN_ALL_CORE
[facility] for Tobacco Control email [email or web address]

## 2019-06-07 NOTE — PROGRESS NOTE ADULT - ASSESSMENT
· Assessment		  HIV NG  Chlamydia NG  BRODERICK GC positive  BCx 6/2, 4  NG  WCx 6/2 ORSA  WBC 21.3 > 16.1>15.1>13.2  6/5 :CT with abscess of ring finger with no OM/ septic arthritis/ tenosynovitis/ foreign body    IMPRESSION:  GC without dissemination. ( doubt that the finger lesion represents GC as it is isolated )  Subcutaneous abscess secondary to ORSA with no evidence of tenosynovitis/ OM/ septic arthritis        RECOMMENDATIONS  Po Bactrim 2 DS q12h for 14 days  Recall prn please

## 2019-06-07 NOTE — DISCHARGE NOTE PROVIDER - HOSPITAL COURSE
28 year old female with a PMHx of  EtOH and non IV drug use presenting with right 3rd and 4th finger swelling and erythema due to Staph Aureus abscess formation and associated cellulitis.         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    - CT hand w/ iv contrast: 4.2 x 1.2 x 5.1 cm subcutaneous dorsal rim-enhancing abscess beginning at     the level of the third and fourth MCP joints and tracking along the     radial aspect of fourth proximal phalanx. Dorsal metacarpal region cellulitis.     - s/p I and D by hand surgery 6/5/19    - Hand surgery:  soapy hand soaks TID with complete submersion of right hand to both patient and nursing, hand elevation    - monitor cbc crp, esr     - Blood cultures neg x 2    - Pain control with tylenol and motrin, avoid opioids    - abscess culture positive for MRSA, sensitive to clinda, genta, doxy, bactrim    - ID: PO bactrim DS 2 tabs q12h for 14 days    - fu with dr. Murphy as outpatient     - continue wound care at home         #) Gonococcal Infection     -GC positive     -negative for Chlamydia     -s/p Ceftriaxone 1 gram IV (in ED) and Azithromycin 1 gram PO due to antimicrobial resistance        #) Herpes Labialis     - Acyclovir 400 mg 3 times daily for 5 to 10 days (started 6/4/19)         #) EtOH Use Disorder    -Drinks up to 16 ounces of liquor daily    -Was on Symptom triggered CIWA protocol         #) History of Polysubstance abuse    -admits to using IV Heroin in past (8 years ago)    -uses cocaine and Ecstasy, not sure if mixed with Heroine or other agents     -counseled on cessation, needs rehab    -Pain control with Tylenol, Motrin, avoid Opiates

## 2019-06-08 LAB
-  AMPICILLIN/SULBACTAM: SIGNIFICANT CHANGE UP
-  CEFAZOLIN: SIGNIFICANT CHANGE UP
-  CLINDAMYCIN: SIGNIFICANT CHANGE UP
-  DAPTOMYCIN: SIGNIFICANT CHANGE UP
-  ERYTHROMYCIN: SIGNIFICANT CHANGE UP
-  GENTAMICIN: SIGNIFICANT CHANGE UP
-  LINEZOLID: SIGNIFICANT CHANGE UP
-  OXACILLIN: SIGNIFICANT CHANGE UP
-  PENICILLIN: SIGNIFICANT CHANGE UP
-  RIFAMPIN: SIGNIFICANT CHANGE UP
-  TETRACYCLINE: SIGNIFICANT CHANGE UP
-  TRIMETHOPRIM/SULFAMETHOXAZOLE: SIGNIFICANT CHANGE UP
-  VANCOMYCIN: SIGNIFICANT CHANGE UP
METHOD TYPE: SIGNIFICANT CHANGE UP

## 2019-06-09 LAB
CULTURE RESULTS: SIGNIFICANT CHANGE UP
SPECIMEN SOURCE: SIGNIFICANT CHANGE UP

## 2019-06-11 ENCOUNTER — APPOINTMENT (OUTPATIENT)
Dept: PLASTIC SURGERY | Facility: CLINIC | Age: 29
End: 2019-06-11

## 2019-06-11 LAB
CULTURE RESULTS: SIGNIFICANT CHANGE UP
CULTURE RESULTS: SIGNIFICANT CHANGE UP
ORGANISM # SPEC MICROSCOPIC CNT: SIGNIFICANT CHANGE UP
ORGANISM # SPEC MICROSCOPIC CNT: SIGNIFICANT CHANGE UP
SPECIMEN SOURCE: SIGNIFICANT CHANGE UP
SPECIMEN SOURCE: SIGNIFICANT CHANGE UP

## 2019-06-13 DIAGNOSIS — A41.1 SEPSIS DUE TO OTHER SPECIFIED STAPHYLOCOCCUS: ICD-10-CM

## 2019-06-13 DIAGNOSIS — F17.210 NICOTINE DEPENDENCE, CIGARETTES, UNCOMPLICATED: ICD-10-CM

## 2019-06-13 DIAGNOSIS — L03.011 CELLULITIS OF RIGHT FINGER: ICD-10-CM
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
## 2019-06-13 DIAGNOSIS — Z86.59 PERSONAL HISTORY OF OTHER MENTAL AND BEHAVIORAL DISORDERS: ICD-10-CM

## 2019-06-13 DIAGNOSIS — E87.6 HYPOKALEMIA: ICD-10-CM

## 2019-06-13 DIAGNOSIS — A54.9 GONOCOCCAL INFECTION, UNSPECIFIED: ICD-10-CM

## 2019-06-13 DIAGNOSIS — L02.511 CUTANEOUS ABSCESS OF RIGHT HAND: ICD-10-CM

## 2019-06-13 DIAGNOSIS — R30.0 DYSURIA: ICD-10-CM

## 2019-06-13 DIAGNOSIS — F10.20 ALCOHOL DEPENDENCE, UNCOMPLICATED: ICD-10-CM
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
## 2019-06-13 DIAGNOSIS — S61.254A OPEN BITE OF RIGHT RING FINGER WITHOUT DAMAGE TO NAIL, INITIAL ENCOUNTER: ICD-10-CM

## 2019-06-13 DIAGNOSIS — B00.1 HERPESVIRAL VESICULAR DERMATITIS: ICD-10-CM

## 2019-06-13 DIAGNOSIS — L03.90 CELLULITIS, UNSPECIFIED: ICD-10-CM

## 2019-07-01 ENCOUNTER — INBOUND DOCUMENT (OUTPATIENT)
Age: 29
End: 2019-07-01

## 2020-04-16 ENCOUNTER — EMERGENCY (EMERGENCY)
Facility: HOSPITAL | Age: 30
LOS: 0 days | Discharge: HOME | End: 2020-04-17
Payer: SUBSIDIZED

## 2020-04-16 VITALS
OXYGEN SATURATION: 98 % | DIASTOLIC BLOOD PRESSURE: 83 MMHG | HEIGHT: 59 IN | WEIGHT: 115.08 LBS | SYSTOLIC BLOOD PRESSURE: 140 MMHG | HEART RATE: 121 BPM | TEMPERATURE: 98 F | RESPIRATION RATE: 18 BRPM

## 2020-04-16 PROCEDURE — 99053 MED SERV 10PM-8AM 24 HR FAC: CPT

## 2020-04-16 PROCEDURE — 99283 EMERGENCY DEPT VISIT LOW MDM: CPT

## 2020-04-17 VITALS — HEART RATE: 98 BPM

## 2020-04-17 RX ORDER — IBUPROFEN 200 MG
1 TABLET ORAL
Qty: 15 | Refills: 0
Start: 2020-04-17 | End: 2020-04-21

## 2020-04-17 RX ORDER — METHOCARBAMOL 500 MG/1
1 TABLET, FILM COATED ORAL
Qty: 10 | Refills: 0
Start: 2020-04-17 | End: 2020-04-21

## 2020-04-17 RX ORDER — METHOCARBAMOL 500 MG/1
1500 TABLET, FILM COATED ORAL ONCE
Refills: 0 | Status: DISCONTINUED | OUTPATIENT
Start: 2020-04-17 | End: 2020-04-17

## 2020-04-17 RX ORDER — DEXAMETHASONE 0.5 MG/5ML
10 ELIXIR ORAL ONCE
Refills: 0 | Status: DISCONTINUED | OUTPATIENT
Start: 2020-04-17 | End: 2020-04-17

## 2020-04-17 RX ORDER — KETOROLAC TROMETHAMINE 30 MG/ML
30 SYRINGE (ML) INJECTION ONCE
Refills: 0 | Status: COMPLETED | OUTPATIENT
Start: 2020-04-17 | End: 2020-04-17

## 2020-04-17 NOTE — ED PROVIDER NOTE - NSFOLLOWUPINSTRUCTIONS_ED_ALL_ED_FT
Please follow up with your primary care physician within 24-72 hours and return immediately if symptoms worsen.    Back Pain    WHAT YOU NEED TO KNOW:    Back pain is common. It can be caused by many conditions, such as arthritis or the breakdown of spinal discs. Your risk for back pain is increased by injuries, lack of activity, or repeated bending and twisting. You may feel sore or stiff on one or both sides of your back. The pain may spread to your buttocks or thighs.    DISCHARGE INSTRUCTIONS:    Return to the emergency department if:     You have pain, numbness, or weakness in one or both legs.      Your pain becomes so severe that you cannot walk.      You cannot control your urine or bowel movements.      You have severe back pain with chest pain.      You have severe back pain, nausea, and vomiting.      You have severe back pain that spreads to your side or genital area.    Contact your healthcare provider if:     You have back pain that does not get better with rest and pain medicine.      You have a fever.      You have pain that worsens when you are on your back or when you rest.      You have pain that worsens when you cough or sneeze.      You lose weight without trying.      You have questions or concerns about your condition or care.    Medicines:     NSAIDs help decrease swelling and pain. This medicine is available with or without a doctor's order. NSAIDs can cause stomach bleeding or kidney problems in certain people. If you take blood thinner medicine, always ask your healthcare provider if NSAIDs are safe for you. Always read the medicine label and follow directions.      Acetaminophen decreases pain and fever. It is available without a doctor's order. Ask how much to take and how often to take it. Follow directions. Read the labels of all other medicines you are using to see if they also contain acetaminophen, or ask your doctor or pharmacist. Acetaminophen can cause liver damage if not taken correctly. Do not use more than 4 grams (4,000 milligrams) total of acetaminophen in one day.       Muscle relaxers help decrease muscle spasms and back pain.      Prescription pain medicine may be given. Ask your healthcare provider how to take this medicine safely. Some prescription pain medicines contain acetaminophen. Do not take other medicines that contain acetaminophen without talking to your healthcare provider. Too much acetaminophen may cause liver damage. Prescription pain medicine may cause constipation. Ask your healthcare provider how to prevent or treat constipation.       Take your medicine as directed. Contact your healthcare provider if you think your medicine is not helping or if you have side effects. Tell him or her if you are allergic to any medicine. Keep a list of the medicines, vitamins, and herbs you take. Include the amounts, and when and why you take them. Bring the list or the pill bottles to follow-up visits. Carry your medicine list with you in case of an emergency.    How to manage your back pain:     Apply ice on your back for 15 to 20 minutes every hour or as directed. Use an ice pack, or put crushed ice in a plastic bag. Cover it with a towel before you apply it to your skin. Ice helps prevent tissue damage and decreases pain.      Apply heat on your back for 20 to 30 minutes every 2 hours for as many days as directed. Heat helps decrease pain and muscle spasms.      Stay active as much as you can without causing more pain. Bed rest could make your back pain worse. Avoid heavy lifting until your pain is gone.      Go to physical therapy as directed. A physical therapist can teach you exercises to help improve movement and strength, and to decrease pain.    Follow up with your healthcare provider in 2 weeks, or as directed: Write down your questions so you remember to ask them during your visits.       © Copyright [employer] 2019 All illustrations and images included in CareNotes are the copyrighted property of [employer]. or [employer].    Athlete's Foot     Athlete's foot (tinea pedis) is a fungal infection of the skin on your feet. It often occurs on the skin that is between or underneath your toes. It can also occur on the soles of your feet. Symptoms include itchy or white and flaky areas on the skin. The infection can spread from person to person (is contagious). It can also spread when a person's bare feet come in contact with the fungus on shower floors or on items such as shoes.  Follow these instructions at home:  Medicines     Apply or take over-the-counter and prescription medicines only as told by your doctor.Apply your antifungal medicine as told by your doctor. Do not stop using the medicine even if your feet start to get better.Foot care     Do not scratch your feet.Keep your feet dry:  Wear cotton or wool socks. Change your socks every day or if they become wet.Wear shoes that allow air to move around, such as sandals or canvas tennis shoes.Wash and dry your feet:  Every day or as told by your doctor.After exercising.Including the area between your toes.General instructions     Do not share any of these items that touch your feet:  Towels.Shoes.Nail clippers.Other personal items.Protect your feet by wearing sandals in wet areas, such as locker rooms and shared showers.Keep all follow-up visits as told by your doctor. This is important.If you have diabetes, keep your blood sugar under control.Contact a doctor if:  You have a fever.You have swelling, pain, warmth, or redness in your foot.Your feet are not getting better with treatment.Your symptoms get worse.You have new symptoms.Summary  Athlete's foot is a fungal infection of the skin on your feet.Symptoms include itchy or white and flaky areas on the skin.Apply your antifungal medicine as told by your doctor.Keep your feet clean and dry.This information is not intended to replace advice given to you by your health care provider. Make sure you discuss any questions you have with your health care provider.

## 2020-04-17 NOTE — ED PROVIDER NOTE - NSFOLLOWUPCLINICS_GEN_ALL_ED_FT
[facility] ([facility])  Rehabilitation  [facility] [address] [place] [address]  Phone: [phone]  Fax:   Follow Up Time: 1-3 Days

## 2020-04-17 NOTE — ED PROVIDER NOTE - PHYSICAL EXAMINATION
Gen: NAD, AOx3  Head: NCAT  HEENT: PERRL, oral mucosa moist, normal conjunctiva, oropharynx clear without exudate or erythema  Lung: CTAB, no respiratory distress, no wheezing, rales, rhonchi  CV: normal s1/s2, rrr, Normal perfusion, pulses 2+ throughout  Abd: soft, NTND, no CVA tenderness  Genitourinary: no pelvic tenderness  MSK: No edema, no visible deformities, full range of motion in all 4 extremities, TTP L thoracic  Neuro: No focal neurologic deficits  Skin: erythematous scaling between 4th and 5th digit  Psych: normal affect

## 2020-04-17 NOTE — ED PROVIDER NOTE - OBJECTIVE STATEMENT
30 yo female with a pmh of asthma presents c/o back pain. pt states she is experiencing reaggravation to her back pain from an injury two months prior. pt states the pain presented while moving a futon yesterday. pain is to the L thoracic region that is aggravated w/ movement and she has some relief with tylenol. she denies any recent injuries or traumas. denies any urinary retention/incontinence, fevers, or saddle anesthesias. she also states to have a rash to her L foot that has been intermittently present for several months. denies any other symptoms including fevers, chill, headache, recent illness/travel, cough, abdominal pain, chest pain, or SOB.

## 2020-04-17 NOTE — ED PROVIDER NOTE - NS ED ROS FT
Constitutional: (-) fever  Eyes/ENT: (-) visual changes   Cardiovascular: (-) chest pain, (-) syncope  Respiratory: (-) cough, (-) shortness of breath  Gastrointestinal: (-) vomiting, (-) diarrhea  Genitourinary: (-) dysuria, (-) hesitancy, (-) frequency   Musculoskeletal: (-) neck pain, (+) back pain, (-) joint pain  Integumentary: (+) rash, (-) edema  Neurological: (-) headache, (-) altered mental status  Allergic/Immunologic: (-) pruritus

## 2020-04-17 NOTE — ED PROVIDER NOTE - PATIENT PORTAL LINK FT
You can access the FollowMyHealth Patient Portal offered by [facility] by registering at the following website: [email or web address]. By joining [employer]’s FollowMyHealth portal, you will also be able to view your health information using other applications (apps) compatible with our system.

## 2020-04-17 NOTE — ED ADULT NURSE NOTE - OBJECTIVE STATEMENT
Pt c/o upper back pain between scapula's, Pt states she has had this pain for awhile but aggravated it tomorrow

## 2020-04-20 DIAGNOSIS — M54.6 PAIN IN THORACIC SPINE: ICD-10-CM

## 2020-04-20 DIAGNOSIS — J45.909 UNSPECIFIED ASTHMA, UNCOMPLICATED: ICD-10-CM

## 2020-04-20 DIAGNOSIS — F17.210 NICOTINE DEPENDENCE, CIGARETTES, UNCOMPLICATED: ICD-10-CM

## 2020-04-20 DIAGNOSIS — Z79.899 OTHER LONG TERM (CURRENT) DRUG THERAPY: ICD-10-CM

## 2020-04-20 DIAGNOSIS — B35.3 TINEA PEDIS: ICD-10-CM

## 2020-12-24 NOTE — PROGRESS NOTE ADULT - I WAS PHYSICALLY PRESENT FOR THE KEY PORTIONS OF THE EVALUATION AND MANAGEMENT (E/M) SERVICE PROVIDED.  I AGREE WITH THE ABOVE HISTORY, PHYSICAL, AND PLAN WHICH I HAVE REVIEWED AND EDITED WHERE APPROPRIATE
Addended by: Jerardo Lacy on: 12/24/2020 10:25 AM     Modules accepted: Orders
Statement Selected
Statement Selected

## 2021-03-10 ENCOUNTER — EMERGENCY (EMERGENCY)
Facility: HOSPITAL | Age: 31
LOS: 0 days | Discharge: LEFT BEFORE DISCHARGE | End: 2021-03-10
Attending: STUDENT IN AN ORGANIZED HEALTH CARE EDUCATION/TRAINING PROGRAM | Admitting: STUDENT IN AN ORGANIZED HEALTH CARE EDUCATION/TRAINING PROGRAM
Payer: MEDICAID

## 2021-03-10 VITALS
WEIGHT: 119.93 LBS | DIASTOLIC BLOOD PRESSURE: 92 MMHG | SYSTOLIC BLOOD PRESSURE: 129 MMHG | OXYGEN SATURATION: 97 % | RESPIRATION RATE: 12 BRPM | HEIGHT: 59 IN | TEMPERATURE: 96 F | HEART RATE: 90 BPM

## 2021-03-10 DIAGNOSIS — T51.91XA TOXIC EFFECT OF UNSPECIFIED ALCOHOL, ACCIDENTAL (UNINTENTIONAL), INITIAL ENCOUNTER: ICD-10-CM

## 2021-03-10 DIAGNOSIS — T40.5X1A POISONING BY COCAINE, ACCIDENTAL (UNINTENTIONAL), INITIAL ENCOUNTER: ICD-10-CM

## 2021-03-10 DIAGNOSIS — T40.2X1A POISONING BY OTHER OPIOIDS, ACCIDENTAL (UNINTENTIONAL), INITIAL ENCOUNTER: ICD-10-CM

## 2021-03-10 DIAGNOSIS — Z79.899 OTHER LONG TERM (CURRENT) DRUG THERAPY: ICD-10-CM

## 2021-03-10 DIAGNOSIS — J45.909 UNSPECIFIED ASTHMA, UNCOMPLICATED: ICD-10-CM

## 2021-03-10 LAB
ALBUMIN SERPL ELPH-MCNC: 4.7 G/DL — SIGNIFICANT CHANGE UP (ref 3.5–5.2)
ALP SERPL-CCNC: 81 U/L — SIGNIFICANT CHANGE UP (ref 30–115)
ALT FLD-CCNC: 15 U/L — SIGNIFICANT CHANGE UP (ref 0–41)
ANION GAP SERPL CALC-SCNC: 14 MMOL/L — SIGNIFICANT CHANGE UP (ref 7–14)
APAP SERPL-MCNC: <5 UG/ML — LOW (ref 10–30)
AST SERPL-CCNC: 25 U/L — SIGNIFICANT CHANGE UP (ref 0–41)
BASOPHILS # BLD AUTO: 0.1 K/UL — SIGNIFICANT CHANGE UP (ref 0–0.2)
BASOPHILS NFR BLD AUTO: 0.8 % — SIGNIFICANT CHANGE UP (ref 0–1)
BILIRUB SERPL-MCNC: <0.2 MG/DL — SIGNIFICANT CHANGE UP (ref 0.2–1.2)
BUN SERPL-MCNC: 10 MG/DL — SIGNIFICANT CHANGE UP (ref 10–20)
CALCIUM SERPL-MCNC: 9.3 MG/DL — SIGNIFICANT CHANGE UP (ref 8.5–10.1)
CHLORIDE SERPL-SCNC: 103 MMOL/L — SIGNIFICANT CHANGE UP (ref 98–110)
CO2 SERPL-SCNC: 20 MMOL/L — SIGNIFICANT CHANGE UP (ref 17–32)
CREAT SERPL-MCNC: 0.8 MG/DL — SIGNIFICANT CHANGE UP (ref 0.7–1.5)
EOSINOPHIL # BLD AUTO: 0.61 K/UL — SIGNIFICANT CHANGE UP (ref 0–0.7)
EOSINOPHIL NFR BLD AUTO: 4.6 % — SIGNIFICANT CHANGE UP (ref 0–8)
ETHANOL SERPL-MCNC: 149 MG/DL — HIGH
GLUCOSE SERPL-MCNC: 98 MG/DL — SIGNIFICANT CHANGE UP (ref 70–99)
HCG SERPL QL: NEGATIVE — SIGNIFICANT CHANGE UP
HCT VFR BLD CALC: 40.9 % — SIGNIFICANT CHANGE UP (ref 37–47)
HGB BLD-MCNC: 14 G/DL — SIGNIFICANT CHANGE UP (ref 12–16)
IMM GRANULOCYTES NFR BLD AUTO: 0.3 % — SIGNIFICANT CHANGE UP (ref 0.1–0.3)
LYMPHOCYTES # BLD AUTO: 26.7 % — SIGNIFICANT CHANGE UP (ref 20.5–51.1)
LYMPHOCYTES # BLD AUTO: 3.52 K/UL — HIGH (ref 1.2–3.4)
MCHC RBC-ENTMCNC: 30.8 PG — SIGNIFICANT CHANGE UP (ref 27–31)
MCHC RBC-ENTMCNC: 34.2 G/DL — SIGNIFICANT CHANGE UP (ref 32–37)
MCV RBC AUTO: 90.1 FL — SIGNIFICANT CHANGE UP (ref 81–99)
MONOCYTES # BLD AUTO: 0.84 K/UL — HIGH (ref 0.1–0.6)
MONOCYTES NFR BLD AUTO: 6.4 % — SIGNIFICANT CHANGE UP (ref 1.7–9.3)
NEUTROPHILS # BLD AUTO: 8.06 K/UL — HIGH (ref 1.4–6.5)
NEUTROPHILS NFR BLD AUTO: 61.2 % — SIGNIFICANT CHANGE UP (ref 42.2–75.2)
NRBC # BLD: 0 /100 WBCS — SIGNIFICANT CHANGE UP (ref 0–0)
PLATELET # BLD AUTO: 346 K/UL — SIGNIFICANT CHANGE UP (ref 130–400)
POTASSIUM SERPL-MCNC: 4.1 MMOL/L — SIGNIFICANT CHANGE UP (ref 3.5–5)
POTASSIUM SERPL-SCNC: 4.1 MMOL/L — SIGNIFICANT CHANGE UP (ref 3.5–5)
PROT SERPL-MCNC: 7.3 G/DL — SIGNIFICANT CHANGE UP (ref 6–8)
RBC # BLD: 4.54 M/UL — SIGNIFICANT CHANGE UP (ref 4.2–5.4)
RBC # FLD: 11.8 % — SIGNIFICANT CHANGE UP (ref 11.5–14.5)
SALICYLATES SERPL-MCNC: <0.3 MG/DL — LOW (ref 4–30)
SODIUM SERPL-SCNC: 137 MMOL/L — SIGNIFICANT CHANGE UP (ref 135–146)
WBC # BLD: 13.17 K/UL — HIGH (ref 4.8–10.8)
WBC # FLD AUTO: 13.17 K/UL — HIGH (ref 4.8–10.8)

## 2021-03-10 PROCEDURE — 93010 ELECTROCARDIOGRAM REPORT: CPT

## 2021-03-10 PROCEDURE — 99285 EMERGENCY DEPT VISIT HI MDM: CPT

## 2021-03-10 RX ORDER — SODIUM CHLORIDE 9 MG/ML
1000 INJECTION INTRAMUSCULAR; INTRAVENOUS; SUBCUTANEOUS ONCE
Refills: 0 | Status: COMPLETED | OUTPATIENT
Start: 2021-03-10 | End: 2021-03-10

## 2021-03-10 RX ORDER — NALOXONE HYDROCHLORIDE 4 MG/.1ML
0.1 SPRAY NASAL ONCE
Refills: 0 | Status: COMPLETED | OUTPATIENT
Start: 2021-03-10 | End: 2021-03-10

## 2021-03-10 RX ADMIN — NALOXONE HYDROCHLORIDE 0.1 MILLIGRAM(S): 4 SPRAY NASAL at 07:49

## 2021-03-10 RX ADMIN — SODIUM CHLORIDE 1000 MILLILITER(S): 9 INJECTION INTRAMUSCULAR; INTRAVENOUS; SUBCUTANEOUS at 07:49

## 2021-03-10 NOTE — ED PROVIDER NOTE - NS ED ROS FT
Constitutional: + overdose. See HPI.  Eyes: No visual changes, eye pain or discharge.   ENMT: No hearing changes, pain, discharge or infections.  Cardiac: No SOB or edema. No chest pain with exertion.  Respiratory: No cough or respiratory distress.  GI: No nausea, vomiting, diarrhea or abdominal pain.  : No dysuria, frequency or burning. No Discharge  MS: No myalgia, muscle weakness, joint pain or back pain.  Neuro: No headache or weakness.   Skin: No skin rash.  Except as documented in the HPI, all other systems are negative.

## 2021-03-10 NOTE — ED PROVIDER NOTE - PHYSICAL EXAMINATION
CONST: responds to name and tactile stimulus.   EYES: pinpoint pupil  NECK: Non-tender, no meningeal signs, supple  CARD: Normal S1 S2; Normal rate and rhythm  RESP: Equal BS B/L, No wheezes, rhonchi or rales. No distress  GI: Soft, non-tender, non-distended.  MS: Normal ROM in all extremities. No edema of lower extremities, no calf pain, radial pulses 2+ bilaterally  SKIN: Warm, dry, no acute rashes. Good turgor  NEURO: responds to tactile stimulus, drowsy, No focal deficits, moving all extremities.

## 2021-03-10 NOTE — ED PROVIDER NOTE - CARE PLAN
Principal Discharge DX:	Opioid overdose   Principal Discharge DX:	Opioid overdose  Secondary Diagnosis:	Eloped from emergency department

## 2021-03-10 NOTE — ED ADULT NURSE NOTE - CHIEF COMPLAINT QUOTE
brought to ED by boyfriend for overdose, as per boyfriend pt took 9 tablets of 15mg oxycodone around 30 minutes ago. pt also drinks daily and had a shot of 99 bananas today  Janak (boyfriend) [phone]

## 2021-03-10 NOTE — ED PROVIDER NOTE - ATTENDING CONTRIBUTION TO CARE
I personally evaluated the patient. I reviewed the Resident’s or Physician Assistant’s note (as assigned above), and agree with the findings and plan except as documented in my note.  30 year old female with a history of etoh abuse/polysubstance abuse presents here for overdose. History obtained from patients boyfriend who states patient had several alcoholic drinks, smoked crack-cocaine and took 9 tablets of 15mg of oxycodone. Patient denies any si/hi.  On exam  CONSTITUTIONAL: WA / WN / NAD  HEAD: NCAT  EYES: PERRL; EOMI; +pinpoint pupils  ENT: Normal pharynx; mucous membranes pink/moist, no erythema.  NECK: Supple;  CARD: RRR; nl S1/S2; no M/R/G.   RESP: Respiratory rate and effort are normal; breath sounds clear and equal bilaterally.  ABD: Soft, NT ND   MSK/EXT: No gross deformities; full range of motion.  SKIN: Warm and dry;   NEURO: drowsy, arrousable with minimal tactile stimulus

## 2021-03-10 NOTE — ED PROVIDER NOTE - NSFOLLOWUPINSTRUCTIONS_ED_ALL_ED_FT
NARCOTIC WITHDRAWAL - AfterCare(R) Instructions(ER/ED)           Narcotic Withdrawal    WHAT YOU NEED TO KNOW:    Withdrawal is a response to a sudden lack of narcotics in your body. Withdrawal happens when you suddenly decrease or stop taking a narcotic you are dependent on. Dependence means you feel you need the narcotic to function mentally or physically. This happens after you have used the narcotic regularly for a long time. Withdrawal can happen with an illegal narcotic such as heroin, or a prescription narcotic such as oxycodone or fentanyl.    DISCHARGE INSTRUCTIONS:    Call your local emergency number (911 in the ), or have someone else call if:   •You have a seizure.      •You cannot be woken.      Call your doctor if:   •You have a fast heartbeat.      •You have nausea and are vomiting, or you cannot stop vomiting.      •You have the following signs and symptoms of dehydration: ?Dry eyes or mouth      ?Increased thirst      ?Dark yellow urine, or urinating little or not at all      ?Headache, dizziness, or confusion      ?Irregular or fast breathing, fast or pounding heartbeat      •You have questions or concerns about your condition or care.      Medicines: You may need any of the following:   •NSAIDs, such as ibuprofen, help decrease swelling, pain, and fever. This medicine is available with or without a doctor's order. NSAIDs can cause stomach bleeding or kidney problems in certain people. If you take blood thinner medicine, always ask your healthcare provider if NSAIDs are safe for you. Always read the medicine label and follow directions.      •Blood pressure medicine decreases symptoms of withdrawal, such as nausea, vomiting, muscle tension, and anxiety.       •Antianxiety medicine decreases anxiety and helps you feel calm and relaxed.      •Antinausea medicine helps calm your stomach and prevent vomiting.       •Take your medicine as directed. Contact your healthcare provider if you think your medicine is not helping or if you have side effects. Tell him of her if you are allergic to any medicine. Keep a list of the medicines, vitamins, and herbs you take. Include the amounts, and when and why you take them. Bring the list or the pill bottles to follow-up visits. Carry your medicine list with you in case of an emergency.      Prevent withdrawal from a prescription narcotic: The best way to prevent withdrawal is to prevent tolerance. You may need to take a different kind of pain medicine after a surgery or injury. You can also talk to your healthcare provider about ways to manage pain without medicine. If you do need to take a narcotic medicine, the following can help prevent withdrawal:   •Do not suddenly stop using the narcotic. If you have been using the narcotic for longer than 2 weeks, a sudden stop may cause dangerous side effects. Work with your healthcare provider to decrease your dose slowly.      •Take a prescribed narcotic exactly as directed. Do not take more than the recommended amount. Do not take it more often or for longer than recommended. If you use a pain patch, be sure to remove the old patch before you place a new one. Talk to your doctor or a pharmacist if you have any questions about your medicine. Narcotics often come with a Medication Guide to help you use it safely. Ask your pharmacists for a copy if you do not get one when you fill the prescription.      •Talk to your provider about signs or symptoms of a problem. Tell your provider if you think you are developing narcotic tolerance or dependence. Work with your provider to stop or lower the amount safely.      Narcotic safety:   •Do not take narcotics that belong to someone else. The kind or amount that person takes may not be right for you.      •Do not mix narcotics with other medicines or alcohol. The combination can cause an overdose, or cause you to stop breathing. Alcohol, sleeping pills, and medicines such as antihistamines can make you sleepy. A combination with narcotics can lead to a coma.      •Learn about the signs of an overdose so you know how to respond. Tell others about these signs so they will know what to do if needed. Talk to your healthcare provider about naloxone. You may be able to keep naloxone at home in case of an overdose. Your family and friends can also be trained on how to give it to you if needed.      •Keep narcotics out of the reach of children. Store narcotics in a locked cabinet or in a location that children cannot get to.      •Follow instructions for what to do with prescription narcotics you do not use. Your healthcare provider will give you instructions for how to dispose of it safely. This helps make sure no one else takes it.      Follow up with your healthcare provider as directed: You may need to return for other tests. You may also be referred to a specialty clinic to receive maintenance therapy medicine on a regular basis. Write down your questions so you remember to ask them during your visits.       © Copyright [employer] 2021           back to top                          © Copyright [employer] 2021         Follow up with your primary medical doctor in 1-2 days

## 2021-03-10 NOTE — ED PROVIDER NOTE - CLINICAL SUMMARY MEDICAL DECISION MAKING FREE TEXT BOX
30 year old female with a history of etoh abuse/polysubstance abuse presents here for overdose. History obtained from patients boyfriend who states patient had several alcoholic drinks, smoked crack-cocaine and took 9 tablets of 15mg of oxycodone. VS reviewed. Labs obtained. Patient was placed on end-tidal c02. 0.1mg of narcan given. Patient was ambulatory in ED accompanied by her boyfriend. Patient refused to stay and eloped.

## 2021-03-10 NOTE — ED ADULT NURSE REASSESSMENT NOTE - NS ED NURSE REASSESS COMMENT FT1
Pt became extremely agitated after receiving Narcan. Pt states she wants to leave. pulls IV out. security and MD at bedside. Pt educated on risks of leaving. given narcan kit. Patient and boyfriend educated on use of Narcan. Attending and resident educated on risks of elopement, however Patient eloped. ANM made aware.

## 2021-03-10 NOTE — ED ADULT TRIAGE NOTE - CHIEF COMPLAINT QUOTE
brought to ED by boyfriend for overdose, as per boyfriend pt took 9 tablets of 15mg oxycodone around 30 minutes ago. pt also drinks daily and had a shot of 99 bananas today brought to ED by boyfriend for overdose, as per boyfriend pt took 9 tablets of 15mg oxycodone around 30 minutes ago. pt also drinks daily and had a shot of 99 bananas today  Janak (boyfriend) [phone]

## 2021-03-10 NOTE — ED PROVIDER NOTE - PROGRESS NOTE DETAILS
after narcan pt now awake, alert w/ no complaints.  mildly agitated. SR: patients boyfriend bedside, states has narcan at home. Patient walking around the ed requesting to leave. pt eloped from ED.  narcan kit given to pt before she eloped.

## 2021-03-10 NOTE — ED PROVIDER NOTE - OBJECTIVE STATEMENT
30 y.o female w/ hx of alcohol abuse, narcotic abuse, crack-cocaine abuse presents to the ED for evaluation of overdose.  Per boyfriend kian between 2-6 am drank 3-4 shots.  Around 0620 hrs spoked crack-cocaine.  Around 0700 hrs took 9 tabs 15 mg oxycodone.  Pt became lethargic so boyfriend sent her to ED for further eval.  Normally drinks 10-15 drinks daily.  Denies fever, chills, trauma/injury, SI/HI, abd pain, back pain, chest pain, dyspnea.  No further complaints. 30 y.o female w/ hx of alcohol abuse, narcotic abuse, crack-cocaine abuse presents to the ED for evaluation of overdose.  Per boyfriend kian between 2-6 am drank 3-4 shots.  Around 0620 hrs spoked crack-cocaine.  Around 0700 hrs took 9 tabs 15 mg oxycodone.  Pt became drowsy so boyfriend sent her to ED for further eval.  Normally drinks 10-15 drinks daily.  Denies fever, chills, trauma/injury, SI/HI, abd pain, back pain, chest pain, dyspnea.  No further complaints.

## 2021-04-22 NOTE — ED ADULT NURSE NOTE - NSSEPSISSUSPECTED_ED_A_ED
Intermediate Repair Preamble Text (Leave Blank If You Do Not Want): Undermining was performed with blunt dissection. No

## 2022-05-19 ENCOUNTER — EMERGENCY (EMERGENCY)
Facility: HOSPITAL | Age: 32
LOS: 0 days | Discharge: HOME | End: 2022-05-19
Attending: STUDENT IN AN ORGANIZED HEALTH CARE EDUCATION/TRAINING PROGRAM | Admitting: STUDENT IN AN ORGANIZED HEALTH CARE EDUCATION/TRAINING PROGRAM
Payer: MEDICAID

## 2022-05-19 VITALS
HEIGHT: 59 IN | OXYGEN SATURATION: 97 % | HEART RATE: 84 BPM | RESPIRATION RATE: 20 BRPM | SYSTOLIC BLOOD PRESSURE: 138 MMHG | DIASTOLIC BLOOD PRESSURE: 78 MMHG | TEMPERATURE: 98 F | WEIGHT: 130.07 LBS

## 2022-05-19 DIAGNOSIS — M25.572 PAIN IN LEFT ANKLE AND JOINTS OF LEFT FOOT: ICD-10-CM
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
## 2022-05-19 PROCEDURE — 73590 X-RAY EXAM OF LOWER LEG: CPT | Mod: 26,LT

## 2022-05-19 PROCEDURE — 73610 X-RAY EXAM OF ANKLE: CPT | Mod: 26,LT

## 2022-05-19 PROCEDURE — 73630 X-RAY EXAM OF FOOT: CPT | Mod: 26,LT

## 2022-05-19 PROCEDURE — 99284 EMERGENCY DEPT VISIT MOD MDM: CPT

## 2022-05-19 RX ORDER — IBUPROFEN 200 MG
600 TABLET ORAL ONCE
Refills: 0 | Status: COMPLETED | OUTPATIENT
Start: 2022-05-19 | End: 2022-05-19

## 2022-05-19 RX ORDER — ACETAMINOPHEN 500 MG
650 TABLET ORAL ONCE
Refills: 0 | Status: COMPLETED | OUTPATIENT
Start: 2022-05-19 | End: 2022-05-19

## 2022-05-19 RX ADMIN — Medication 600 MILLIGRAM(S): at 10:18

## 2022-05-19 RX ADMIN — Medication 650 MILLIGRAM(S): at 09:22

## 2022-05-19 NOTE — ED PROVIDER NOTE - NSFOLLOWUPINSTRUCTIONS_ED_ALL_ED_FT
Please follow up with orthopedics in 1-3 days    Ankle Sprain    WHAT YOU NEED TO KNOW:    An ankle sprain happens when 1 or more ligaments in your ankle joint stretch or tear. Ligaments are tough tissues that connect bones. Ligaments support your joints and keep your bones in place.    DISCHARGE INSTRUCTIONS:    Return to the emergency department if:     You have severe pain in your ankle.      Your foot or toes are cold or numb.      Your ankle becomes more weak or unstable (wobbly).      You are unable to put any weight on your ankle or foot.      Your swelling has increased or returned.    Contact your healthcare provider if:     Your pain does not go away, even after treatment.      You have questions or concerns about your condition or care.    Medicines: You may need any of the following:     NSAIDs, such as ibuprofen, help decrease swelling, pain, and fever. This medicine is available with or without a doctor's order. NSAIDs can cause stomach bleeding or kidney problems in certain people. If you take blood thinner medicine, always ask your healthcare provider if NSAIDs are safe for you. Always read the medicine label and follow directions.      Acetaminophen decreases pain. It is available without a doctor's order. Ask how much to take and how often to take it. Follow directions. Acetaminophen can cause liver damage if not taken correctly.      Prescription pain medicine may be given. Ask how to take this medicine safely.      Take your medicine as directed. Contact your healthcare provider if you think your medicine is not helping or if you have side effects. Tell him or her if you are allergic to any medicine. Keep a list of the medicines, vitamins, and herbs you take. Include the amounts, and when and why you take them. Bring the list or the pill bottles to follow-up visits. Carry your medicine list with you in case of an emergency.    Self care:     Use support devices, such as a brace, cast, or splint, may be needed to limit your movement and protect your joint. You may need to use crutches to decrease your pain as you move around.       Go to physical therapy as directed. A physical therapist teaches you exercises to help improve movement and strength, and to decrease pain.      Rest your ankle so that it can heal. Return to normal activities as directed.      Apply ice on your ankle for 15 to 20 minutes every hour or as directed. Use an ice pack, or put crushed ice in a plastic bag. Cover it with a towel. Ice helps prevent tissue damage and decreases swelling and pain.      Compress your ankle. Ask if you should wrap an elastic bandage around your injured ligament. An elastic bandage provides support and helps decrease swelling and movement so your joint can heal. Wear as long as directed.      Elevate your ankle above the level of your heart as often as you can. This will help decrease swelling and pain. Prop your ankle on pillows or blankets to keep it elevated comfortably. Elevate Limb         Prevent another ankle sprain:     Let your ankle heal. Find out how long your ligament needs to heal. Do not do any physical activity until your healthcare provider says it is okay. If you start activity too soon, you may develop a more serious injury.      Always warm up and stretch before you exercise or play sports.      Use the right equipment. Always wear shoes that fit well and are made for the activity that you are doing. You may also need ankle supports, elbow and knee pads, or braces.    Follow up with your healthcare provider as directed: Write down your questions so you remember to ask them during your visits.        © Copyright [employer] 2019 All illustrations and images included in CareNotes are the copyrighted property of [employer].D[employer]M., Inc[employer] or [employer].

## 2022-05-19 NOTE — ED PROVIDER NOTE - CARE PROVIDER_API CALL
Jd Butts)  Orthopaedic Surgery  [address] [place] [address]  Phone: [phone]  Fax: [phone]  Follow Up Time:

## 2022-05-19 NOTE — ED PROVIDER NOTE - PHYSICAL EXAMINATION
Physical Exam    Vital Signs: I have reviewed the initial vital signs.  Constitutional: appears stated age, no acute distress  Eyes: Conjunctiva pink, Sclera clear  Musculoskeletal: ttp to lateral left ankle, 5th metatarsal ttp, rom intact.   Integumentary: warm, dry, no rash  Neurologic: awake, alert, nvi

## 2022-05-19 NOTE — ED PROVIDER NOTE - DISCHARGE REVIEW MATERIAL PRESENTED
Patient has not been seen since her post partum exam on 9/13/19 by Dr Montes De Oca. Patient requesting a refill on Labetalol 200 mg bid. Last time medication was reordered was 12/ 10/19. Originally she was to take two tabs bid. She found this dose to be to strong, and her bps were normal only taking 200 mg qd. Patient is agreeable to either medication check or cpe( not due for pap till 2022) with any provider that can see her first. She will also bring her bp readings to her appointment. OK to refill Labetalol 200 mg qd for 30 days to get her through her appointment? Please advise.   .

## 2022-05-19 NOTE — ED PROVIDER NOTE - CLINICAL SUMMARY MEDICAL DECISION MAKING FREE TEXT BOX
Previously healthy 31-year-old female presents to the ER for left ankle pain after missing his.  No focal weakness or numbness.  Vitals noted, triage note reviewed.  Exam as above.  X-rays personally reviewed and negative for acute fracture or dislocation.  Patient was given analgesia, offered crutches, and close follow-up with orthopedics.  She was given return precautions, pain management education, and she verbalized understanding.  All questions answered at the bedside.

## 2022-05-19 NOTE — ED PROVIDER NOTE - OBJECTIVE STATEMENT
30 yo female, no pmh, presents to ed for left ankle pain, twisted yesterday, mild, aching, no radiation. denies numbness, tingling.

## 2022-05-19 NOTE — ED PROVIDER NOTE - PATIENT PORTAL LINK FT
You can access the FollowMyHealth Patient Portal offered by [facility] by registering at the following website: [email or web address]. By joining [employer]’s FollowMyHealth portal, you will also be able to view your health information using other applications (apps) compatible with our system.

## 2022-05-19 NOTE — ED PROVIDER NOTE - PROGRESS NOTE DETAILS
I was directly involved in the care of this patient. PA Fellow Blayne note/plan reviewed and agreed.

## 2022-05-19 NOTE — ED PROVIDER NOTE - NS ED ATTENDING STATEMENT MOD
This was a shared visit with the EZEQUIEL. I reviewed and verified the documentation and independently performed the documented:

## 2022-08-31 NOTE — ED PROVIDER NOTE - WR ORDER STATUS 2
Advised patient that she will need to hold aspirin for 24 hours prior to injection and patient also confirmed she does not take any herbal supplements.     Patient is scheduled for 10/04/2022 @ 11:15AM
Question Answer   Anesthesia Type Local   Provider [facility]   Procedure Other (please add comment)   Implants No   Medical clearance requested (will send to Pain Navigator) No   Patient has Medicare coverage?  Yes   Comments (Please list entire procedure name here.) Right median nerve block under ultrasound guidance
Resulted

## 2022-12-05 NOTE — H&P ADULT - PROBLEM SELECTOR PROBLEM 1
Cellulitis of right hand Tremfya Counseling: I discussed with the patient the risks of guselkumab including but not limited to immunosuppression, serious infections, and drug reactions.  The patient understands that monitoring is required including a PPD at baseline and must alert us or the primary physician if symptoms of infection or other concerning signs are noted.

## 2023-03-14 NOTE — ED PROVIDER NOTE - NS ED MD DISPO ADMITTING SERVICE
Detail Level: Simple
Otc Regimen: Apply Sunscreen QAM: minimum of 30 SPF (suggest CeraVe brand, mineral based), apply 15 minutes prior to sun exposure and reapply every 2 hours or sooner if skin gets wet or sweats.
Action 4: Continue
Detail Level: Zone
Otc Regimen: I recommended a broad spectrum mineral based sunscreen with a SPF of 30 or higher (CeraVe brand), applied at least 15 minutes prior to expected sun exposure and then every 2 hours after that as long as sun exposure continues.
MED
Initiate Treatment: Advise patient to massage when putting on Vaseline

## 2023-05-16 NOTE — ED ADULT TRIAGE NOTE - TEMPERATURE IN FAHRENHEIT (DEGREES F)
Pt with h/o variable hyponatremia. SOsm calc c/w hypotonic hyponatremia. Farrukh low suggestive of sodium-avid state (i.e. RAAS activation), UOsm quite low, though c/f psychogenic polydipsia vs poor solute intake.. Unclear contribution of ARB as OP as pt not filling medications consistently.   likely 2/2 SIADH as improved with fluid restriction   - Trend Na   - c/w fluid restriction to 1200 and increased to 3 salt tabs 97.8

## 2024-01-01 NOTE — H&P ADULT - NSHPRISKHIVSCREEN_GEN_ALL_CORE
Left appointment on voicemail.  
DISPLAY PLAN FREE TEXT
DISPLAY PLAN FREE TEXT
Not applicable (known HIV negative status in last year)

## 2024-01-24 ENCOUNTER — INPATIENT (INPATIENT)
Facility: HOSPITAL | Age: 34
LOS: 2 days | Discharge: LEFT BEFORE DISCHARGE | DRG: 364 | End: 2024-01-27
Attending: INTERNAL MEDICINE | Admitting: FAMILY MEDICINE
Payer: MEDICAID

## 2024-01-24 VITALS
WEIGHT: 125 LBS | SYSTOLIC BLOOD PRESSURE: 128 MMHG | HEART RATE: 84 BPM | TEMPERATURE: 98 F | DIASTOLIC BLOOD PRESSURE: 91 MMHG | RESPIRATION RATE: 18 BRPM | HEIGHT: 59 IN | OXYGEN SATURATION: 98 %

## 2024-01-24 DIAGNOSIS — L03.90 CELLULITIS, UNSPECIFIED: ICD-10-CM

## 2024-01-24 LAB
ALBUMIN SERPL ELPH-MCNC: 4.3 G/DL — SIGNIFICANT CHANGE UP (ref 3.5–5.2)
ALP SERPL-CCNC: 71 U/L — SIGNIFICANT CHANGE UP (ref 30–115)
ALT FLD-CCNC: 23 U/L — SIGNIFICANT CHANGE UP (ref 0–41)
ANION GAP SERPL CALC-SCNC: 12 MMOL/L — SIGNIFICANT CHANGE UP (ref 7–14)
AST SERPL-CCNC: 24 U/L — SIGNIFICANT CHANGE UP (ref 0–41)
BASOPHILS # BLD AUTO: 0.04 K/UL — SIGNIFICANT CHANGE UP (ref 0–0.2)
BASOPHILS NFR BLD AUTO: 0.3 % — SIGNIFICANT CHANGE UP (ref 0–1)
BILIRUB SERPL-MCNC: <0.2 MG/DL — SIGNIFICANT CHANGE UP (ref 0.2–1.2)
BUN SERPL-MCNC: 7 MG/DL — LOW (ref 10–20)
CALCIUM SERPL-MCNC: 8.7 MG/DL — SIGNIFICANT CHANGE UP (ref 8.4–10.5)
CHLORIDE SERPL-SCNC: 97 MMOL/L — LOW (ref 98–110)
CO2 SERPL-SCNC: 27 MMOL/L — SIGNIFICANT CHANGE UP (ref 17–32)
CREAT SERPL-MCNC: 0.6 MG/DL — LOW (ref 0.7–1.5)
EGFR: 122 ML/MIN/1.73M2 — SIGNIFICANT CHANGE UP
EOSINOPHIL # BLD AUTO: 0.01 K/UL — SIGNIFICANT CHANGE UP (ref 0–0.7)
EOSINOPHIL NFR BLD AUTO: 0.1 % — SIGNIFICANT CHANGE UP (ref 0–8)
ETHANOL SERPL-MCNC: 188 MG/DL — HIGH
GLUCOSE SERPL-MCNC: 102 MG/DL — HIGH (ref 70–99)
HCG SERPL QL: NEGATIVE — SIGNIFICANT CHANGE UP
HCT VFR BLD CALC: 40.3 % — SIGNIFICANT CHANGE UP (ref 37–47)
HGB BLD-MCNC: 14.6 G/DL — SIGNIFICANT CHANGE UP (ref 12–16)
IMM GRANULOCYTES NFR BLD AUTO: 0.3 % — SIGNIFICANT CHANGE UP (ref 0.1–0.3)
LYMPHOCYTES # BLD AUTO: 26.5 % — SIGNIFICANT CHANGE UP (ref 20.5–51.1)
LYMPHOCYTES # BLD AUTO: 3.74 K/UL — HIGH (ref 1.2–3.4)
MCHC RBC-ENTMCNC: 31.5 PG — HIGH (ref 27–31)
MCHC RBC-ENTMCNC: 36.2 G/DL — SIGNIFICANT CHANGE UP (ref 32–37)
MCV RBC AUTO: 86.9 FL — SIGNIFICANT CHANGE UP (ref 81–99)
MONOCYTES # BLD AUTO: 1.23 K/UL — HIGH (ref 0.1–0.6)
MONOCYTES NFR BLD AUTO: 8.7 % — SIGNIFICANT CHANGE UP (ref 1.7–9.3)
NEUTROPHILS # BLD AUTO: 9.06 K/UL — HIGH (ref 1.4–6.5)
NEUTROPHILS NFR BLD AUTO: 64.1 % — SIGNIFICANT CHANGE UP (ref 42.2–75.2)
NRBC # BLD: 0 /100 WBCS — SIGNIFICANT CHANGE UP (ref 0–0)
PLATELET # BLD AUTO: 256 K/UL — SIGNIFICANT CHANGE UP (ref 130–400)
PMV BLD: 9.5 FL — SIGNIFICANT CHANGE UP (ref 7.4–10.4)
POTASSIUM SERPL-MCNC: 4.2 MMOL/L — SIGNIFICANT CHANGE UP (ref 3.5–5)
POTASSIUM SERPL-SCNC: 4.2 MMOL/L — SIGNIFICANT CHANGE UP (ref 3.5–5)
PROT SERPL-MCNC: 7.5 G/DL — SIGNIFICANT CHANGE UP (ref 6–8)
RBC # BLD: 4.64 M/UL — SIGNIFICANT CHANGE UP (ref 4.2–5.4)
RBC # FLD: 11.9 % — SIGNIFICANT CHANGE UP (ref 11.5–14.5)
SODIUM SERPL-SCNC: 136 MMOL/L — SIGNIFICANT CHANGE UP (ref 135–146)
WBC # BLD: 14.12 K/UL — HIGH (ref 4.8–10.8)
WBC # FLD AUTO: 14.12 K/UL — HIGH (ref 4.8–10.8)

## 2024-01-24 PROCEDURE — 99223 1ST HOSP IP/OBS HIGH 75: CPT

## 2024-01-24 PROCEDURE — 87040 BLOOD CULTURE FOR BACTERIA: CPT

## 2024-01-24 PROCEDURE — 93970 EXTREMITY STUDY: CPT

## 2024-01-24 PROCEDURE — 93970 EXTREMITY STUDY: CPT | Mod: 26

## 2024-01-24 PROCEDURE — 84703 CHORIONIC GONADOTROPIN ASSAY: CPT

## 2024-01-24 PROCEDURE — 80326 AMPHETAMINES 5 OR MORE: CPT

## 2024-01-24 PROCEDURE — 85027 COMPLETE CBC AUTOMATED: CPT

## 2024-01-24 PROCEDURE — 80307 DRUG TEST PRSMV CHEM ANLYZR: CPT

## 2024-01-24 PROCEDURE — 80053 COMPREHEN METABOLIC PANEL: CPT

## 2024-01-24 PROCEDURE — 99285 EMERGENCY DEPT VISIT HI MDM: CPT

## 2024-01-24 PROCEDURE — 87186 SC STD MICRODIL/AGAR DIL: CPT

## 2024-01-24 PROCEDURE — 80353 DRUG SCREENING COCAINE: CPT

## 2024-01-24 PROCEDURE — 87077 CULTURE AEROBIC IDENTIFY: CPT

## 2024-01-24 PROCEDURE — 36415 COLL VENOUS BLD VENIPUNCTURE: CPT

## 2024-01-24 PROCEDURE — 85025 COMPLETE CBC W/AUTO DIFF WBC: CPT

## 2024-01-24 PROCEDURE — 87070 CULTURE OTHR SPECIMN AEROBIC: CPT

## 2024-01-24 PROCEDURE — 80346 BENZODIAZEPINES1-12: CPT

## 2024-01-24 PROCEDURE — 73590 X-RAY EXAM OF LOWER LEG: CPT | Mod: 26,LT

## 2024-01-24 RX ORDER — CEFEPIME 1 G/1
INJECTION, POWDER, FOR SOLUTION INTRAMUSCULAR; INTRAVENOUS
Refills: 0 | Status: DISCONTINUED | OUTPATIENT
Start: 2024-01-24 | End: 2024-01-25

## 2024-01-24 RX ORDER — VANCOMYCIN HCL 1 G
750 VIAL (EA) INTRAVENOUS EVERY 12 HOURS
Refills: 0 | Status: DISCONTINUED | OUTPATIENT
Start: 2024-01-24 | End: 2024-01-26

## 2024-01-24 RX ORDER — KETOROLAC TROMETHAMINE 30 MG/ML
10 SYRINGE (ML) INJECTION ONCE
Refills: 0 | Status: DISCONTINUED | OUTPATIENT
Start: 2024-01-24 | End: 2024-01-24

## 2024-01-24 RX ORDER — HEPARIN SODIUM 5000 [USP'U]/ML
5000 INJECTION INTRAVENOUS; SUBCUTANEOUS EVERY 12 HOURS
Refills: 0 | Status: DISCONTINUED | OUTPATIENT
Start: 2024-01-25 | End: 2024-01-26

## 2024-01-24 RX ORDER — HYDROXYZINE HCL 10 MG
25 TABLET ORAL EVERY 4 HOURS
Refills: 0 | Status: DISCONTINUED | OUTPATIENT
Start: 2024-01-24 | End: 2024-01-26

## 2024-01-24 RX ORDER — FOLIC ACID 0.8 MG
1 TABLET ORAL DAILY
Refills: 0 | Status: DISCONTINUED | OUTPATIENT
Start: 2024-01-24 | End: 2024-01-26

## 2024-01-24 RX ORDER — CEFEPIME 1 G/1
1000 INJECTION, POWDER, FOR SOLUTION INTRAMUSCULAR; INTRAVENOUS ONCE
Refills: 0 | Status: COMPLETED | OUTPATIENT
Start: 2024-01-24 | End: 2024-01-24

## 2024-01-24 RX ORDER — THIAMINE MONONITRATE (VIT B1) 100 MG
100 TABLET ORAL DAILY
Refills: 0 | Status: DISCONTINUED | OUTPATIENT
Start: 2024-01-24 | End: 2024-01-26

## 2024-01-24 RX ORDER — NICOTINE POLACRILEX 2 MG
1 GUM BUCCAL DAILY
Refills: 0 | Status: DISCONTINUED | OUTPATIENT
Start: 2024-01-24 | End: 2024-01-26

## 2024-01-24 RX ORDER — KETOROLAC TROMETHAMINE 30 MG/ML
15 SYRINGE (ML) INJECTION ONCE
Refills: 0 | Status: DISCONTINUED | OUTPATIENT
Start: 2024-01-24 | End: 2024-01-24

## 2024-01-24 RX ORDER — CEFEPIME 1 G/1
1000 INJECTION, POWDER, FOR SOLUTION INTRAMUSCULAR; INTRAVENOUS EVERY 12 HOURS
Refills: 0 | Status: DISCONTINUED | OUTPATIENT
Start: 2024-01-25 | End: 2024-01-25

## 2024-01-24 RX ORDER — AMPICILLIN SODIUM AND SULBACTAM SODIUM 250; 125 MG/ML; MG/ML
3 INJECTION, POWDER, FOR SUSPENSION INTRAMUSCULAR; INTRAVENOUS ONCE
Refills: 0 | Status: COMPLETED | OUTPATIENT
Start: 2024-01-24 | End: 2024-01-24

## 2024-01-24 RX ADMIN — Medication 100 MILLIGRAM(S): at 20:08

## 2024-01-24 RX ADMIN — CEFEPIME 100 MILLIGRAM(S): 1 INJECTION, POWDER, FOR SOLUTION INTRAMUSCULAR; INTRAVENOUS at 23:23

## 2024-01-24 RX ADMIN — Medication 15 MILLIGRAM(S): at 18:22

## 2024-01-24 RX ADMIN — AMPICILLIN SODIUM AND SULBACTAM SODIUM 200 GRAM(S): 250; 125 INJECTION, POWDER, FOR SUSPENSION INTRAMUSCULAR; INTRAVENOUS at 20:22

## 2024-01-24 NOTE — ED PROVIDER NOTE - PHYSICAL EXAMINATION
CONST: in NAD  CARD: Normal S1 S2; Normal rate and rhythm  RESP: Equal BS B/L, No wheezes, rhonchi or rales. No distress  GI: Soft, non-tender, non-distended.  MS: Normal ROM in all extremities. No midline spinal tenderness.  SKIN: Warm, dry, Good turgor. 6cm x6cm area of redness and mild swelling to left anterior shin  NEURO: A&Ox3, No focal deficits. Strength 5/5 with no sensory deficits. Steady gait

## 2024-01-24 NOTE — PATIENT PROFILE ADULT - FALL HARM RISK - HARM RISK INTERVENTIONS
Assistance with ambulation/Assistance OOB with selected safe patient handling equipment/Communicate Risk of Fall with Harm to all staff/Monitor for mental status changes/Monitor gait and stability/Reinforce activity limits and safety measures with patient and family/Tailored Fall Risk Interventions/Toileting schedule using arm’s reach rule for commode and bathroom/Use of alarms - bed, chair and/or voice tab/Visual Cue: Yellow wristband and red socks/Bed in lowest position, wheels locked, appropriate side rails in place/Call bell, personal items and telephone in reach/Instruct patient to call for assistance before getting out of bed or chair/Non-slip footwear when patient is out of bed/[place] to call system/Physically safe environment - no spills, clutter or unnecessary equipment/Purposeful Proactive Rounding/Room/bathroom lighting operational, light cord in reach

## 2024-01-24 NOTE — ED ADULT NURSE NOTE - NSFALLUNIVINTERV_ED_ALL_ED
Bed/Stretcher in lowest position, wheels locked, appropriate side rails in place/Call bell, personal items and telephone in reach/Instruct patient to call for assistance before getting out of bed/chair/stretcher/Non-slip footwear applied when patient is off stretcher/[place] to call system/Physically safe environment - no spills, clutter or unnecessary equipment/Purposeful proactive rounding/Room/bathroom lighting operational, light cord in reach

## 2024-01-24 NOTE — H&P ADULT - HISTORY OF PRESENT ILLNESS
CC.  LLE cellulitis   HPI.  patient is a 32yo female with hx of Illicit drug usage, and ETOH usage presenting with LLE swelling, reddness, and pain that has been going on for the past several day, and progressively worsen.  fever at home.  denies any headache, dizziness, blurry vision, CP, SOB, palpitation, abdm pain, N/V/D, numbness or weakness      Patient had similar symptoms in the past. denies inability to walk, weakness. pt does endorse smoking marijuana and snorting heroin.  denies any hx of IVDU

## 2024-01-24 NOTE — ED PROVIDER NOTE - ATTENDING APP SHARED VISIT CONTRIBUTION OF CARE
33yF hx illicit drug use p/w LLE cellulitis - c/o severe pain to L lower leg w/ red wound.  Denies IVDU but pt is clinically high - sleepy but arousable, slowed speech, abnormal affect.  Exam w/ intact distal perfusion but area of tender erythema w/ hyperalgesia, no crepitus, no fluctuance, +scab and now small area of drainage from cellulitis.   ?fever.

## 2024-01-24 NOTE — ED PROVIDER NOTE - OBJECTIVE STATEMENT
patient is a 34yo female complaining of abscess/ cellulitis on left shin. pt first noticed redness and pain to left shin 2 days ago, has progressively gotten worse. pt states she has had fever x 2days, has taken Tylenol. has had similar symptoms in the past. denies inability to walk, weakness. pt does endorse smoking marijuana and snorting heroin.

## 2024-01-24 NOTE — ED PROVIDER NOTE - CLINICAL SUMMARY MEDICAL DECISION MAKING FREE TEXT BOX
33yF substance use disorder p/w LLE cellulitis with concern for possible IVDU.  Pt hyperalgesic but w/ intact distal perfusion, no crepitus and no fluctuance. Xray w/o fx or subQ air to concern for nec fasc.  US prelim w/o DVT.  Labs w/ leukocytosis.  Pt treated with IV abx and d/w hospitalist for admission.

## 2024-01-24 NOTE — H&P ADULT - ASSESSMENT
Patient is 32 yo female with hx of Illicit drug usage, tob and etoh usage presenting with       #LLE cellulitis  -US of LE pending   -Will obtain Blood culture, surgery, and ID consult   -Will start patient on cefepime, and vancomycin  -Monitor vanco trough    #ETOH  Tob, and Illicit drug usage  -Counselled  -Nicotine patch, thiamine and folate   -IV ativan, hydroxyzine, PRN for withdrawal  -Seizure precautions  -CIWA protocol  -Addication medicine consult     #Progress Note Handoff  Pending (specify):  as above   Family discussion:  plan of care was discussed with patient   in details.  all questions were answered.  seems to understand, and in agreement  Disposition:  home    Patient is 34 yo female with hx of Illicit drug usage, tob and etoh usage presenting with       #LLE cellulitis  -US of LE pending   -Will obtain Blood culture, surgery, and ID consult   -Will start patient on cefepime, and vancomycin  -Monitor vanco trough    #ETOH  Tob, and Illicit drug usage  -Counselled  -Nicotine patch, thiamine and folate   -Librium taper (hold for sedation), IV ativan, hydroxyzine, PRN for withdrawal  -Seizure precautions  -CIWA protocol  -Addication medicine consult     #Progress Note Handoff  Pending (specify):  as above   Family discussion:  plan of care was discussed with patient   in details.  all questions were answered.  seems to understand, and in agreement  Disposition:  home

## 2024-01-24 NOTE — ED ADULT NURSE NOTE - CODE STROKE ACTIVE YN
Called pt and confirmed that RX has been sent to pharmacy .  Pt verbally understands and has no further questions   
No

## 2024-01-24 NOTE — ED PROVIDER NOTE - NS ED MD DISPO SPECIAL CONSIDERATION1
Message   Recorded as Task   Date: 12/05/2017 02:59 PM, Created By: Karl Chavez   Task Name: 6. Patient Appt Request   Assigned To: NARGIS,LUL   Regarding Patient: Jaylene Dillard, Status: Active   CommentKarl Chavez - 05 Dec 2017 2:59 PM     TASK CREATED  PT STATES SHE HAS HAD DIARRHEA FOR 2-3 DAYS. UNABLE TO KEEP ANYTHING IN HER SYSTEM. ASKING TO BE SEEN TODAY OR TOMORROW. [address] [facility] [phone]   [facility] - 05 Dec 2017 4:55 PM     TASK EDITED  [address]? ?try Imodium per instructions on label. Call if not better.         Signatures   Electronically signed by : Paige Augustin M.D.; Dec  5 2017  4:55PM CST None

## 2024-01-24 NOTE — H&P ADULT - NSHPPHYSICALEXAM_GEN_ALL_CORE
Vital Signs Last 24 Hrs  T(C): 37.1 (24 Jan 2024 20:39), Max: 37.1 (24 Jan 2024 20:39)  T(F): 98.7 (24 Jan 2024 20:39), Max: 98.7 (24 Jan 2024 20:39)  HR: 98 (24 Jan 2024 20:39) (84 - 98)  BP: 128/63 (24 Jan 2024 20:39) (128/63 - 128/91)  BP(mean): --  RR: 18 (24 Jan 2024 20:39) (18 - 18)  SpO2: 98% (24 Jan 2024 20:39) (98% - 98%)    Parameters below as of 24 Jan 2024 20:39  Patient On (Oxygen Delivery Method): room air      PHYSICAL EXAM-  GENERAL: NAD,    HEAD:  Atraumatic, Normocephalic  EYES: EOMI, PERRLA, conjunctiva and sclera clear  NECK: Supple, No JVD, Normal thyroid  NERVOUS SYSTEM:  Alert & Oriented X3, Motor Strength 5/5 B/L upper and lower extremities; DTRs 2+ intact and symmetric  CHEST/LUNG: Clear to percussion bilaterally; No rales, rhonchi, wheezing, or rubs  HEART: Regular rate and rhythm; No murmurs, rubs, or gallops  ABDOMEN: Soft, Nontender, Nondistended; Bowel sounds present  EXTREMITIES: LLE erythema with tenderness, and swelling extending below the knee abd above ankle.  no joint involvement.  soft compartment   SKIN: No rashes or lesions

## 2024-01-25 DIAGNOSIS — F19.20 OTHER PSYCHOACTIVE SUBSTANCE DEPENDENCE, UNCOMPLICATED: ICD-10-CM

## 2024-01-25 LAB
ALBUMIN SERPL ELPH-MCNC: 3.9 G/DL — SIGNIFICANT CHANGE UP (ref 3.5–5.2)
ALP SERPL-CCNC: 67 U/L — SIGNIFICANT CHANGE UP (ref 30–115)
ALT FLD-CCNC: 18 U/L — SIGNIFICANT CHANGE UP (ref 0–41)
AMPHET UR-MCNC: POSITIVE
ANION GAP SERPL CALC-SCNC: 14 MMOL/L — SIGNIFICANT CHANGE UP (ref 7–14)
AST SERPL-CCNC: 18 U/L — SIGNIFICANT CHANGE UP (ref 0–41)
BARBITURATES UR SCN-MCNC: NEGATIVE — SIGNIFICANT CHANGE UP
BENZODIAZ UR-MCNC: POSITIVE
BILIRUB SERPL-MCNC: 0.4 MG/DL — SIGNIFICANT CHANGE UP (ref 0.2–1.2)
BUN SERPL-MCNC: 9 MG/DL — LOW (ref 10–20)
CALCIUM SERPL-MCNC: 8.8 MG/DL — SIGNIFICANT CHANGE UP (ref 8.4–10.5)
CHLORIDE SERPL-SCNC: 97 MMOL/L — LOW (ref 98–110)
CO2 SERPL-SCNC: 25 MMOL/L — SIGNIFICANT CHANGE UP (ref 17–32)
COCAINE METAB.OTHER UR-MCNC: POSITIVE
CREAT SERPL-MCNC: 0.7 MG/DL — SIGNIFICANT CHANGE UP (ref 0.7–1.5)
EGFR: 118 ML/MIN/1.73M2 — SIGNIFICANT CHANGE UP
GLUCOSE SERPL-MCNC: 116 MG/DL — HIGH (ref 70–99)
HCT VFR BLD CALC: 40.4 % — SIGNIFICANT CHANGE UP (ref 37–47)
HGB BLD-MCNC: 14.4 G/DL — SIGNIFICANT CHANGE UP (ref 12–16)
MCHC RBC-ENTMCNC: 31.5 PG — HIGH (ref 27–31)
MCHC RBC-ENTMCNC: 35.6 G/DL — SIGNIFICANT CHANGE UP (ref 32–37)
MCV RBC AUTO: 88.4 FL — SIGNIFICANT CHANGE UP (ref 81–99)
METHADONE UR-MCNC: NEGATIVE — SIGNIFICANT CHANGE UP
NRBC # BLD: 0 /100 WBCS — SIGNIFICANT CHANGE UP (ref 0–0)
OPIATES UR-MCNC: NEGATIVE — SIGNIFICANT CHANGE UP
PCP SPEC-MCNC: SIGNIFICANT CHANGE UP
PLATELET # BLD AUTO: 234 K/UL — SIGNIFICANT CHANGE UP (ref 130–400)
PMV BLD: 9.2 FL — SIGNIFICANT CHANGE UP (ref 7.4–10.4)
POTASSIUM SERPL-MCNC: 3.5 MMOL/L — SIGNIFICANT CHANGE UP (ref 3.5–5)
POTASSIUM SERPL-SCNC: 3.5 MMOL/L — SIGNIFICANT CHANGE UP (ref 3.5–5)
PROPOXYPHENE QUALITATIVE URINE RESULT: NEGATIVE — SIGNIFICANT CHANGE UP
PROT SERPL-MCNC: 6.9 G/DL — SIGNIFICANT CHANGE UP (ref 6–8)
RBC # BLD: 4.57 M/UL — SIGNIFICANT CHANGE UP (ref 4.2–5.4)
RBC # FLD: 11.9 % — SIGNIFICANT CHANGE UP (ref 11.5–14.5)
SODIUM SERPL-SCNC: 136 MMOL/L — SIGNIFICANT CHANGE UP (ref 135–146)
WBC # BLD: 14.25 K/UL — HIGH (ref 4.8–10.8)
WBC # FLD AUTO: 14.25 K/UL — HIGH (ref 4.8–10.8)

## 2024-01-25 PROCEDURE — 99254 IP/OBS CNSLTJ NEW/EST MOD 60: CPT | Mod: 57

## 2024-01-25 PROCEDURE — 99252 IP/OBS CONSLTJ NEW/EST SF 35: CPT

## 2024-01-25 PROCEDURE — 99232 SBSQ HOSP IP/OBS MODERATE 35: CPT

## 2024-01-25 RX ORDER — ACETAMINOPHEN 500 MG
650 TABLET ORAL EVERY 6 HOURS
Refills: 0 | Status: DISCONTINUED | OUTPATIENT
Start: 2024-01-25 | End: 2024-01-26

## 2024-01-25 RX ADMIN — Medication 250 MILLIGRAM(S): at 05:19

## 2024-01-25 RX ADMIN — Medication 1 MILLIGRAM(S): at 13:02

## 2024-01-25 RX ADMIN — Medication 250 MILLIGRAM(S): at 18:49

## 2024-01-25 RX ADMIN — HEPARIN SODIUM 5000 UNIT(S): 5000 INJECTION INTRAVENOUS; SUBCUTANEOUS at 05:20

## 2024-01-25 RX ADMIN — CEFEPIME 100 MILLIGRAM(S): 1 INJECTION, POWDER, FOR SOLUTION INTRAMUSCULAR; INTRAVENOUS at 18:06

## 2024-01-25 RX ADMIN — Medication 100 MILLIGRAM(S): at 13:02

## 2024-01-25 RX ADMIN — Medication 650 MILLIGRAM(S): at 06:00

## 2024-01-25 RX ADMIN — Medication 25 MILLIGRAM(S): at 05:21

## 2024-01-25 RX ADMIN — CEFEPIME 100 MILLIGRAM(S): 1 INJECTION, POWDER, FOR SOLUTION INTRAMUSCULAR; INTRAVENOUS at 05:19

## 2024-01-25 RX ADMIN — Medication 650 MILLIGRAM(S): at 05:22

## 2024-01-25 NOTE — PROGRESS NOTE ADULT - ASSESSMENT
34 yo female with hx of Illicit drug usage, tob and etoh usage presenting with       #LLE cellulitis  -US of LE- no DVT  -follow Blood culture  -surgery consult appreciated, will reevaluate tomorrow for possible I&D   -continue cefepime, and vancomycin  -Monitor vanco trough    #ETOH  Tob, and Illicit drug usage  -Counselled  -Nicotine patch, thiamine and folate   -PRN ativan, no need for taper  -Seizure precautions  -WA protocol  -Addication medicine consult appreciated

## 2024-01-25 NOTE — CONSULT NOTE ADULT - ASSESSMENT
34yo female with hx of Illicit drug usage, and ETOH dependance - 2 pints of vodka/day x 2 years is consulted for Lt LE cellulitis.    A/P:  -Pain mgmt  -IV abx  -IV hydration  -GI and DVT prophyla 32yo female with hx of Illicit drug usage, and ETOH dependance - 2 pints of vodka/day x 2 years is consulted for Lt LE cellulitis.    A/P:  -Pain mgmt  -IV abx  -IV hydration  -GI and DVT prophyla  -Will monitor   -Discussed with Dr. Conway

## 2024-01-25 NOTE — CONSULT NOTE ADULT - NSCONSULTADDITIONALINFOA_GEN_ALL_CORE
I saw and examined the patient.  She has a tender erythematous patch in the pretibial region of her left lower extremity with evidence of likely possible drainage of some fluid and a scab.  I recommended trial of 24 hours of IV antibiotics and we will reassess the patient tomorrow for incision and drainage in the OR possibly Saturday morning if there is no improvement or worsening is noted. Surgery team will draw a line around the erythema to track changes.  Consider ultrasound of that region however I am not certain that she will tolerate anything else

## 2024-01-25 NOTE — CONSULT NOTE ADULT - PROBLEM SELECTOR RECOMMENDATION 9
INCOMPLETE:    After evaluation at this time protocol .....Pt should be on Thiamine and Folic acid. Pt will be monitored and supportive care provided.  Obtain UDS if not done already.  Use of Vistaril for anxiety.  Consider adding gabapentin for anxiety standing order and follow up with PMD/Program for continuation of treatment.    Abdominal ultrasound AFP every 6 months for HCC screening  -EGD outpatient for esophageal varices screening   -Follow up with Private GI or our [facility] located at [address] [place] [address]. Phone Number: [phone]  -Alcohol counseling provided   CATCH team involved for aftercare and pt will follow up with aftercare After evaluation at this time no need for standing protocol would use PRN or CIWA treatment protocol  Pt should be on Thiamine and Folic acid. Pt will be monitored and supportive care provided.  Obtain UDS if not done already.  Use of Vistaril for anxiety.  Consider adding gabapentin for anxiety standing order and follow up with PMD/Program for continuation of treatment.    Abdominal ultrasound AFP every 6 months for HCC screening  -EGD outpatient for esophageal varices screening   -Follow up with Private GI or our [facility] located at [address] [place] [address]. Phone Number: [phone]  -Alcohol counseling not provided pt refused   CATCH team involved for aftercare if pt changes her mind about needing help/assistance will follow up with aftercare

## 2024-01-25 NOTE — CONSULT NOTE ADULT - SUBJECTIVE AND OBJECTIVE BOX
32yo female with hx of Illicit drug usage, and ETOH usage presenting with LLE swelling, reddness, and pain that has been going on for the past several day, and progressively worsen.  fever at home.  denies any headache, dizziness, blurry vision, CP, SOB, palpitation, abdm pain, N/V/D, numbness or weakness      Patient had similar symptoms in the past. denies inability to walk, weakness. pt does endorse smoking marijuana and snorting heroin.  denies any hx of IVDU    PAST MEDICAL & SURGICAL HISTORY:  Asthma  No significant past surgical history      Vital Signs Last 24 Hrs  T(C): 38.6 (25 Jan 2024 04:56), Max: 38.6 (25 Jan 2024 04:56)  T(F): 101.4 (25 Jan 2024 04:56), Max: 101.4 (25 Jan 2024 04:56)  HR: 109 (25 Jan 2024 04:56) (84 - 110)  BP: 118/70 (25 Jan 2024 04:56) (118/70 - 128/91)  BP(mean): --  RR: 16 (25 Jan 2024 04:56) (16 - 18)  SpO2: 98% (24 Jan 2024 22:47) (98% - 98%)    Parameters below as of 24 Jan 2024 22:47  Patient On (Oxygen Delivery Method): room air    PHYSICAL EXAM:    Constitutional: NAD, A&O x3    Eyes: PERRLA, no conjuctivitis    Neck: no lymphadenopathy    Respiratory: +air entry, no rales, no rhonchi, no wheezes    Cardiovascular: +S1 and S2, regular rate and rhythm    Gastrointestinal: +BS, soft, non-tender, not distended    Extremities:  no edema, no calf tenderness    Vascular: +dorsal pedis and radial pulses, no extremity cyanosis    Neurological: sensation intact, ROM equal B/L, CN II-XII intact    Skin: no rashes, normal turgor                            14.6   14.12 )-----------( 256      ( 24 Jan 2024 18:18 )             40.3     01-24    136  |  97<L>  |  7<L>  ----------------------------<  102<H>  4.2   |  27  |  0.6<L>    Ca    8.7      24 Jan 2024 18:18    TPro  7.5  /  Alb  4.3  /  TBili  <0.2  /  DBili  x   /  AST  24  /  ALT  23  /  AlkPhos  71  01-24          Urinalysis Basic - ( 24 Jan 2024 18:18 )    Color: x / Appearance: x / SG: x / pH: x  Gluc: 102 mg/dL / Ketone: x  / Bili: x / Urobili: x   Blood: x / Protein: x / Nitrite: x   Leuk Esterase: x / RBC: x / WBC x   Sq Epi: x / Non Sq Epi: x / Bacteria: x                               34yo female with hx of Illicit drug usage, and ETOH dependance - 2 pints of vodka/day x 2 years  presenting with LLE swelling, redness,  and pain that has been going on for the past several day, and progressively worsen.  fever at home.  denies any headache, dizziness, blurry vision, CP, SOB, palpitation, abdm pain, N/V/D, numbness or weakness    Patient had similar symptoms in the past. denies inability to walk, weakness. pt does endorse smoking marijuana and snorting heroin.  denies any hx of IVDU    PAST MEDICAL & SURGICAL HISTORY:  Asthma  No significant past surgical history      Vital Signs Last 24 Hrs  T(C): 38.6 (25 Jan 2024 04:56), Max: 38.6 (25 Jan 2024 04:56)  T(F): 101.4 (25 Jan 2024 04:56), Max: 101.4 (25 Jan 2024 04:56)  HR: 109 (25 Jan 2024 04:56) (84 - 110)  BP: 118/70 (25 Jan 2024 04:56) (118/70 - 128/91)  BP(mean): --  RR: 16 (25 Jan 2024 04:56) (16 - 18)  SpO2: 98% (24 Jan 2024 22:47) (98% - 98%)    Parameters below as of 24 Jan 2024 22:47  Patient On (Oxygen Delivery Method): room air    PHYSICAL EXAM:    Constitutional: NAD, A&O x3    Eyes: PERRLA, no conjuctivitis    Neck: no lymphadenopathy    Respiratory: +air entry, no rales, no rhonchi, no wheezes    Cardiovascular: +S1 and S2, regular rate and rhythm    Gastrointestinal: +BS, soft, non-tender, not distended    Extremities:  no edema, no calf tenderness    Vascular: +dorsal pedis and radial pulses, no extremity cyanosis    Neurological: sensation intact, ROM equal B/L, CN II-XII intact    Skin: no rashes, normal turgor                            14.6   14.12 )-----------( 256      ( 24 Jan 2024 18:18 )             40.3     01-24    136  |  97<L>  |  7<L>  ----------------------------<  102<H>  4.2   |  27  |  0.6<L>    Ca    8.7      24 Jan 2024 18:18    TPro  7.5  /  Alb  4.3  /  TBili  <0.2  /  DBili  x   /  AST  24  /  ALT  23  /  AlkPhos  71  01-24          Urinalysis Basic - ( 24 Jan 2024 18:18 )    Color: x / Appearance: x / SG: x / pH: x  Gluc: 102 mg/dL / Ketone: x  / Bili: x / Urobili: x   Blood: x / Protein: x / Nitrite: x   Leuk Esterase: x / RBC: x / WBC x   Sq Epi: x / Non Sq Epi: x / Bacteria: x                               34yo female with hx of Illicit drug usage, and ETOH dependance - 2 pints of vodka/day x 2 years  presenting with LLE swelling, redness,  and pain that has been going on for the past several day, and progressively worsen, fever at home. Denies any headache, dizziness, blurry vision, CP, SOB, palpitation, abdm pain, N/V/D, numbness or weakness.  Patient had similar symptoms in the past. denies inability to walk, weakness. pt does endorse smoking marijuana and snorting heroin. Denies any hx of IVDU    PAST MEDICAL & SURGICAL HISTORY:  Asthma  No significant past surgical history      Vital Signs Last 24 Hrs  T(C): 38.6 (25 Jan 2024 04:56), Max: 38.6 (25 Jan 2024 04:56)  T(F): 101.4 (25 Jan 2024 04:56), Max: 101.4 (25 Jan 2024 04:56)  HR: 109 (25 Jan 2024 04:56) (84 - 110)  BP: 118/70 (25 Jan 2024 04:56) (118/70 - 128/91)  BP(mean): --  RR: 16 (25 Jan 2024 04:56) (16 - 18)  SpO2: 98% (24 Jan 2024 22:47) (98% - 98%)    Parameters below as of 24 Jan 2024 22:47  Patient On (Oxygen Delivery Method): room air    PHYSICAL EXAM:    Constitutional: NAD, A&O x3    Eyes: PERRLA, no conjuctivitis    Neck: no lymphadenopathy    Respiratory: +air entry, no rales, no rhonchi, no wheezes    Cardiovascular: +S1 and S2, regular rate and rhythm    Gastrointestinal: +BS, soft, non-tender, not distended    Extremities:  Lt leg redness, (+) TTP, swollen, no calf tenderness    Vascular: +dorsal pedis and radial pulses, no extremity cyanosis    Neurological: sensation intact, ROM equal B/L, CN II-XII intact    Skin: no rashes, normal turgor                            14.6   14.12 )-----------( 256      ( 24 Jan 2024 18:18 )             40.3     01-24    136  |  97<L>  |  7<L>  ----------------------------<  102<H>  4.2   |  27  |  0.6<L>    Ca    8.7      24 Jan 2024 18:18    TPro  7.5  /  Alb  4.3  /  TBili  <0.2  /  DBili  x   /  AST  24  /  ALT  23  /  AlkPhos  71  01-24          Urinalysis Basic - ( 24 Jan 2024 18:18 )    Color: x / Appearance: x / SG: x / pH: x  Gluc: 102 mg/dL / Ketone: x  / Bili: x / Urobili: x   Blood: x / Protein: x / Nitrite: x   Leuk Esterase: x / RBC: x / WBC x   Sq Epi: x / Non Sq Epi: x / Bacteria: x

## 2024-01-25 NOTE — PROGRESS NOTE ADULT - SUBJECTIVE AND OBJECTIVE BOX
34 yo female with hx of Illicit drug usage, tob and etoh usage presenting with Cellulitis.    Today:  Seen at bedside, no new complaints.          REVIEW OF SYSTEMS:  No complaints    MEDICATIONS  (STANDING):  cefepime   IVPB      cefepime   IVPB 1000 milliGRAM(s) IV Intermittent every 12 hours  chlordiazePOXIDE 25 milliGRAM(s) Oral every 4 hours  chlordiazePOXIDE   Oral   folic acid 1 milliGRAM(s) Oral daily  heparin   Injectable 5000 Unit(s) SubCutaneous every 12 hours  nicotine - 21 mG/24Hr(s) Patch 21 Patch Transdermal daily  thiamine 100 milliGRAM(s) Oral daily  vancomycin  IVPB 750 milliGRAM(s) IV Intermittent every 12 hours    MEDICATIONS  (PRN):  acetaminophen     Tablet .. 650 milliGRAM(s) Oral every 6 hours PRN Temp greater or equal to 38C (100.4F), Mild Pain (1 - 3)  hydrOXYzine hydrochloride 25 milliGRAM(s) Oral every 4 hours PRN Anxiety  LORazepam   Injectable 1 milliGRAM(s) IV Push every 4 hours PRN ciwa score above 8      Allergies  lithium (Unknown)        Vital Signs Last 24 Hrs  T(C): 37.9 (25 Jan 2024 14:54), Max: 38.6 (25 Jan 2024 04:56)  T(F): 100.2 (25 Jan 2024 14:54), Max: 101.4 (25 Jan 2024 04:56)  HR: 99 (25 Jan 2024 14:54) (84 - 110)  BP: 108/77 (25 Jan 2024 14:54) (108/77 - 128/91)  RR: 18 (25 Jan 2024 14:54) (16 - 18)  SpO2: 98% (24 Jan 2024 22:47) (98% - 98%)    Parameters below as of 24 Jan 2024 22:47  Patient On (Oxygen Delivery Method): room air        PHYSICAL EXAM:  GENERAL: NAD,    HEAD:  Atraumatic, Normocephalic  EYES: EOMI, PERRLA, conjunctiva and sclera clear  NECK: Supple, No JVD, Normal thyroid  NERVOUS SYSTEM:  Alert & Oriented X3, Motor Strength 5/5 B/L upper and lower extremities; DTRs 2+ intact and symmetric  CHEST/LUNG: Clear to percussion bilaterally; No rales, rhonchi, wheezing, or rubs  HEART: Regular rate and rhythm; No murmurs, rubs, or gallops  ABDOMEN: Soft, Nontender, Nondistended; Bowel sounds present  EXTREMITIES: LLE erythema with tenderness, and swelling extending below the knee abd above ankle.  no joint involvement.  soft compartment       LABS:                        14.4   14.25 )-----------( 234      ( 25 Jan 2024 08:36 )             40.4     01-25    136  |  97<L>  |  9<L>  ----------------------------<  116<H>  3.5   |  25  |  0.7    Ca    8.8      25 Jan 2024 08:36    TPro  6.9  /  Alb  3.9  /  TBili  0.4  /  DBili  x   /  AST  18  /  ALT  18  /  AlkPhos  67  01-25      Urinalysis Basic - ( 25 Jan 2024 08:36 )    Color: x / Appearance: x / SG: x / pH: x  Gluc: 116 mg/dL / Ketone: x  / Bili: x / Urobili: x   Blood: x / Protein: x / Nitrite: x   Leuk Esterase: x / RBC: x / WBC x   Sq Epi: x / Non Sq Epi: x / Bacteria: x

## 2024-01-25 NOTE — CONSULT NOTE ADULT - SUBJECTIVE AND OBJECTIVE BOX
INCOMPLETE:    patient is a 34yo female with hx of Illicit drug usage, and ETOH usage presenting with LLE swelling, reddness, and pain that has been going on for the past several day, and progressively worsen.  fever at home.  denies any headache, dizziness, blurry vision, CP, SOB, palpitation, abdm pain, N/V/D, numbness or weakness        Pt interviewed, examined and EMR chart reviewed.  Pt evaluation for polysubstance abuse.  Pt admits to drinking--- x    months. Last Drink   Hx of withdrawal   variable periods of sobriety in the past.  Pt admits to using  x --months. Last use   Withdrawal Seizures  Has been in detox before _____yes,   _____No    SOCIAL HISTORY:    REVIEW OF SYSTEMS:    Constitutional: No fever, weight loss or fatigue  ENT:  No difficulty hearing, tinnitus, vertigo; No sinus or throat pain  Neck: No pain or stiffness  Respiratory: No cough, wheezing, chills or hemoptysis  Cardiovascular: No chest pain, palpitations, shortness of breath, dizziness or leg swelling  Gastrointestinal: No abdominal or epigastric pain. No nausea, vomiting or hematemesis; No diarrhea or constipation. No melena or hematochezia.  Neurological: No headaches, memory loss, loss of strength, numbness or tremors  Musculoskeletal: No joint pain or swelling; No muscle, back or extremity pain  Psychiatric: No depression, anxiety, mood swings or difficulty sleeping    MEDICATIONS  (STANDING):  cefepime   IVPB 1000 milliGRAM(s) IV Intermittent every 12 hours  cefepime   IVPB      chlordiazePOXIDE 25 milliGRAM(s) Oral every 4 hours  chlordiazePOXIDE   Oral   folic acid 1 milliGRAM(s) Oral daily  heparin   Injectable 5000 Unit(s) SubCutaneous every 12 hours  nicotine - 21 mG/24Hr(s) Patch 21 Patch Transdermal daily  thiamine 100 milliGRAM(s) Oral daily  vancomycin  IVPB 750 milliGRAM(s) IV Intermittent every 12 hours    MEDICATIONS  (PRN):  acetaminophen     Tablet .. 650 milliGRAM(s) Oral every 6 hours PRN Temp greater or equal to 38C (100.4F), Mild Pain (1 - 3)  hydrOXYzine hydrochloride 25 milliGRAM(s) Oral every 4 hours PRN Anxiety  LORazepam   Injectable 1 milliGRAM(s) IV Push every 4 hours PRN ciwa score above 8      Vital Signs Last 24 Hrs  T(C): 37.6 (25 Jan 2024 06:44), Max: 38.6 (25 Jan 2024 04:56)  T(F): 99.7 (25 Jan 2024 06:44), Max: 101.4 (25 Jan 2024 04:56)  HR: 109 (25 Jan 2024 04:56) (84 - 110)  BP: 118/70 (25 Jan 2024 04:56) (118/70 - 128/91)  BP(mean): --  RR: 16 (25 Jan 2024 04:56) (16 - 18)  SpO2: 98% (24 Jan 2024 22:47) (98% - 98%)    Parameters below as of 24 Jan 2024 22:47  Patient On (Oxygen Delivery Method): room air        PHYSICAL EXAM:    Constitutional: NAD, well-groomed, well-developed  HEENT: PERRLA, EOMI, Normal Hearing,   Neck: No LAD, No JVD  Back: Normal spine flexure, No CVA tenderness  Respiratory: CTAB/L  Cardiovascular: S1 and S2, RRR, no M/G/R  Gastrointestinal: BS+, soft, NT/ND  Extremities: No peripheral edema  Neurological: A/O x 3, no focal deficits    LABS:                        14.6   14.12 )-----------( 256      ( 24 Jan 2024 18:18 )             40.3     01-24    136  |  97<L>  |  7<L>  ----------------------------<  102<H>  4.2   |  27  |  0.6<L>    Ca    8.7      24 Jan 2024 18:18    TPro  7.5  /  Alb  4.3  /  TBili  <0.2  /  DBili  x   /  AST  24  /  ALT  23  /  AlkPhos  71  01-24      Urinalysis Basic - ( 24 Jan 2024 18:18 )    Color: x / Appearance: x / SG: x / pH: x  Gluc: 102 mg/dL / Ketone: x  / Bili: x / Urobili: x   Blood: x / Protein: x / Nitrite: x   Leuk Esterase: x / RBC: x / WBC x   Sq Epi: x / Non Sq Epi: x / Bacteria: x      Drug Screen Urine:  Alcohol Level  Alcohol, Blood: 188 mg/dL (01-24-24 @ 18:18)        RADIOLOGY & ADDITIONAL STUDIES:   FROM H&P:    patient is a 32yo female with hx of Illicit drug usage, and ETOH usage presenting with LLE swelling, reddness, and pain that has been going on for the past several day, and progressively worsen.  fever at home.  denies any headache, dizziness, blurry vision, CP, SOB, palpitation, abdm pain, N/V/D, numbness or weakness        Pt interviewed, examined and EMR chart reviewed.  Pt evaluation for polysubstance abuse.  Pt admits to drinking and using cocaine.  Pt did not want to talk about her use. Pt refused to continue the converdation except to sat she will continue drinking after discharge. Pt states she does not need any medication for withdrawal.      SOCIAL HISTORY:    REVIEW OF SYSTEMS:    Pt refused  MEDICATIONS  (STANDING):  cefepime   IVPB 1000 milliGRAM(s) IV Intermittent every 12 hours  cefepime   IVPB      chlordiazePOXIDE 25 milliGRAM(s) Oral every 4 hours  chlordiazePOXIDE   Oral   folic acid 1 milliGRAM(s) Oral daily  heparin   Injectable 5000 Unit(s) SubCutaneous every 12 hours  nicotine - 21 mG/24Hr(s) Patch 21 Patch Transdermal daily  thiamine 100 milliGRAM(s) Oral daily  vancomycin  IVPB 750 milliGRAM(s) IV Intermittent every 12 hours    MEDICATIONS  (PRN):  acetaminophen     Tablet .. 650 milliGRAM(s) Oral every 6 hours PRN Temp greater or equal to 38C (100.4F), Mild Pain (1 - 3)  hydrOXYzine hydrochloride 25 milliGRAM(s) Oral every 4 hours PRN Anxiety  LORazepam   Injectable 1 milliGRAM(s) IV Push every 4 hours PRN ciwa score above 8      Vital Signs Last 24 Hrs  T(C): 37.6 (25 Jan 2024 06:44), Max: 38.6 (25 Jan 2024 04:56)  T(F): 99.7 (25 Jan 2024 06:44), Max: 101.4 (25 Jan 2024 04:56)  HR: 109 (25 Jan 2024 04:56) (84 - 110)  BP: 118/70 (25 Jan 2024 04:56) (118/70 - 128/91)  BP(mean): --  RR: 16 (25 Jan 2024 04:56) (16 - 18)  SpO2: 98% (24 Jan 2024 22:47) (98% - 98%)    Parameters below as of 24 Jan 2024 22:47  Patient On (Oxygen Delivery Method): room air        PHYSICAL EXAM:    Not performed    LABS:                        14.6   14.12 )-----------( 256      ( 24 Jan 2024 18:18 )             40.3     01-24    136  |  97<L>  |  7<L>  ----------------------------<  102<H>  4.2   |  27  |  0.6<L>    Ca    8.7      24 Jan 2024 18:18    TPro  7.5  /  Alb  4.3  /  TBili  <0.2  /  DBili  x   /  AST  24  /  ALT  23  /  AlkPhos  71  01-24      Urinalysis Basic - ( 24 Jan 2024 18:18 )    Color: x / Appearance: x / SG: x / pH: x  Gluc: 102 mg/dL / Ketone: x  / Bili: x / Urobili: x   Blood: x / Protein: x / Nitrite: x   Leuk Esterase: x / RBC: x / WBC x   Sq Epi: x / Non Sq Epi: x / Bacteria: x      Drug Screen Urine:  Alcohol Level  Alcohol, Blood: 188 mg/dL (01-24-24 @ 18:18)        RADIOLOGY & ADDITIONAL STUDIES:

## 2024-01-25 NOTE — CONSULT NOTE ADULT - SUBJECTIVE AND OBJECTIVE BOX
INFECTIOUS DISEASE CONSULT NOTE    Patient is a 33y old  Female who presents with a chief complaint of LLE cellulitis (25 Jan 2024 15:52)    HPI:  CC.  LLE cellulitis   HPI.  patient is a 32yo female with hx of Illicit drug usage, and ETOH usage presenting with LLE swelling, reddness, and pain that has been going on for the past several day, and progressively worsen.  fever at home.  denies any headache, dizziness, blurry vision, CP, SOB, palpitation, abdm pain, N/V/D, numbness or weakness      Patient had similar symptoms in the past. denies inability to walk, weakness. pt does endorse smoking marijuana and snorting heroin.  denies any hx of IVDU (24 Jan 2024 21:46)         Prior hospital charts reviewed [Yes]  Primary team notes reviewed [Yes]  Other consultant notes reviewed [Yes]    REVIEW OF SYSTEMS:      PAST MEDICAL & SURGICAL HISTORY:  Asthma      No significant past surgical history          SOCIAL HISTORY:  - Born in _____, migrated [place] in 19XX  - Currently working as / Retired  - Lives with _____; no pets  - No recent travel  - Denies tobacco use  - Denies alcohol use  - Denies illicit drug use  - Currently sexually active, has one male/female sexual partner    FAMILY HISTORY:      Allergies:  lithium (Unknown)      ANTIMICROBIALS:  cefepime   IVPB    cefepime   IVPB 1000 every 12 hours  vancomycin  IVPB 750 every 12 hours      ANTIMICROBIALS (past 90 days):  MEDICATIONS  (STANDING):  ampicillin/sulbactam  IVPB   200 mL/Hr IV Intermittent (01-24-24 @ 20:22)    cefepime   IVPB   100 mL/Hr IV Intermittent (01-25-24 @ 05:19)    cefepime   IVPB   100 mL/Hr IV Intermittent (01-24-24 @ 23:23)    doxycycline IVPB   100 mL/Hr IV Intermittent (01-24-24 @ 20:08)    vancomycin  IVPB   250 mL/Hr IV Intermittent (01-25-24 @ 05:19)        OTHER MEDS:   MEDICATIONS  (STANDING):  acetaminophen     Tablet .. 650 every 6 hours PRN  heparin   Injectable 5000 every 12 hours  hydrOXYzine hydrochloride 25 every 4 hours PRN  LORazepam   Injectable 1 every 4 hours PRN      VITALS:  Vital Signs Last 24 Hrs  T(F): 100.2 (01-25-24 @ 14:54), Max: 101.4 (01-25-24 @ 04:56)    Vital Signs Last 24 Hrs  HR: 99 (01-25-24 @ 14:54) (84 - 110)  BP: 108/77 (01-25-24 @ 14:54) (108/77 - 128/91)  RR: 18 (01-25-24 @ 14:54)  SpO2: 98% (01-24-24 @ 22:47) (98% - 98%)  Wt(kg): --    EXAM:    Labs:                        14.4   14.25 )-----------( 234      ( 25 Jan 2024 08:36 )             40.4     01-25    136  |  97<L>  |  9<L>  ----------------------------<  116<H>  3.5   |  25  |  0.7    Ca    8.8      25 Jan 2024 08:36    TPro  6.9  /  Alb  3.9  /  TBili  0.4  /  DBili  x   /  AST  18  /  ALT  18  /  AlkPhos  67  01-25      WBC Trend:  WBC Count: 14.25 (01-25-24 @ 08:36)  WBC Count: 14.12 (01-24-24 @ 18:18)      Auto Neutrophil #: 9.06 K/uL (01-24-24 @ 18:18)      Creatine Trend:  Creatinine: 0.7 (01-25)  Creatinine: 0.6 (01-24)      Liver Biochemical Testing Trend:  Alanine Aminotransferase (ALT/SGPT): 18 (01-25)  Alanine Aminotransferase (ALT/SGPT): 23 (01-24)  Aspartate Aminotransferase (AST/SGOT): 18 (01-25-24 @ 08:36)  Aspartate Aminotransferase (AST/SGOT): 24 (01-24-24 @ 18:18)  Bilirubin Total: 0.4 (01-25)  Bilirubin Total: <0.2 (01-24)      Trend LDH      Auto Eosinophil %: 0.1 % (01-24-24 @ 18:18)      Urinalysis Basic - ( 25 Jan 2024 08:36 )    Color: x / Appearance: x / SG: x / pH: x  Gluc: 116 mg/dL / Ketone: x  / Bili: x / Urobili: x   Blood: x / Protein: x / Nitrite: x   Leuk Esterase: x / RBC: x / WBC x   Sq Epi: x / Non Sq Epi: x / Bacteria: x        MICROBIOLOGY:            RADIOLOGY:  imaging below personally reviewed   INFECTIOUS DISEASE CONSULT NOTE    Patient is a 33y old  Female who presents with a chief complaint of LLE cellulitis (25 Jan 2024 15:52)    HPI:  CC.  LLE cellulitis   HPI.  patient is a 34yo female with hx of Illicit drug usage, and ETOH usage presenting with LLE swelling, reddness, and pain that has been going on for the past several day, and progressively worsen.  fever at home.  denies any headache, dizziness, blurry vision, CP, SOB, palpitation, abdm pain, N/V/D, numbness or weakness      Patient had similar symptoms in the past. denies inability to walk, weakness. pt does endorse smoking marijuana and snorting heroin.  denies any hx of IVDU (24 Jan 2024 21:46)      Prior hospital charts reviewed [Yes]  Primary team notes reviewed [Yes]  Other consultant notes reviewed [Yes]    REVIEW OF SYSTEMS:  Limited as patient declined to answer questions  Reports pain on LLE    PAST MEDICAL & SURGICAL HISTORY:  Asthma      No significant past surgical history          SOCIAL HISTORY:  Declined to answer    FAMILY HISTORY:  Declined to answer    Allergies:  lithium (Unknown)      ANTIMICROBIALS:  cefepime   IVPB    cefepime   IVPB 1000 every 12 hours  vancomycin  IVPB 750 every 12 hours      ANTIMICROBIALS (past 90 days):  MEDICATIONS  (STANDING):  ampicillin/sulbactam  IVPB   200 mL/Hr IV Intermittent (01-24-24 @ 20:22)    cefepime   IVPB   100 mL/Hr IV Intermittent (01-25-24 @ 05:19)    cefepime   IVPB   100 mL/Hr IV Intermittent (01-24-24 @ 23:23)    doxycycline IVPB   100 mL/Hr IV Intermittent (01-24-24 @ 20:08)    vancomycin  IVPB   250 mL/Hr IV Intermittent (01-25-24 @ 05:19)        OTHER MEDS:   MEDICATIONS  (STANDING):  acetaminophen     Tablet .. 650 every 6 hours PRN  heparin   Injectable 5000 every 12 hours  hydrOXYzine hydrochloride 25 every 4 hours PRN  LORazepam   Injectable 1 every 4 hours PRN      VITALS:  Vital Signs Last 24 Hrs  T(F): 100.2 (01-25-24 @ 14:54), Max: 101.4 (01-25-24 @ 04:56)    Vital Signs Last 24 Hrs  HR: 99 (01-25-24 @ 14:54) (84 - 110)  BP: 108/77 (01-25-24 @ 14:54) (108/77 - 128/91)  RR: 18 (01-25-24 @ 14:54)  SpO2: 98% (01-24-24 @ 22:47) (98% - 98%)  Wt(kg): --    EXAM:  LIMITED as patient is not cooperative  GENERAL: NAD, lying in bed  HEAD: No head lesions  EYES: Conjunctiva pink and cornea white  EAR, NOSE, MOUTH, THROAT: Normal external ears and nose  NECK: Supple, nontender to palpation; no JVD  RESPIRATORY: Clear to auscultation bilaterally  CARDIOVASCULAR: S1 S2  GASTROINTESTINAL: Soft, nontender, nondistended; normoactive bowel sounds  EXTREMITIES: No clubbing, cyanosis, or petal edema  NERVOUS SYSTEM: Alert and oriented to person, time, place and situation, speech clear. No focal deficits   MUSCULOSKELETAL: No joint erythema, swelling or pain  SKIN: LLE anterior shin swelling and erythema, indurated, no superficial thrombophlebitis  PSYCH: Normal affect      Labs:                        14.4   14.25 )-----------( 234      ( 25 Jan 2024 08:36 )             40.4     01-25    136  |  97<L>  |  9<L>  ----------------------------<  116<H>  3.5   |  25  |  0.7    Ca    8.8      25 Jan 2024 08:36    TPro  6.9  /  Alb  3.9  /  TBili  0.4  /  DBili  x   /  AST  18  /  ALT  18  /  AlkPhos  67  01-25      WBC Trend:  WBC Count: 14.25 (01-25-24 @ 08:36)  WBC Count: 14.12 (01-24-24 @ 18:18)      Auto Neutrophil #: 9.06 K/uL (01-24-24 @ 18:18)      Creatine Trend:  Creatinine: 0.7 (01-25)  Creatinine: 0.6 (01-24)      Liver Biochemical Testing Trend:  Alanine Aminotransferase (ALT/SGPT): 18 (01-25)  Alanine Aminotransferase (ALT/SGPT): 23 (01-24)  Aspartate Aminotransferase (AST/SGOT): 18 (01-25-24 @ 08:36)  Aspartate Aminotransferase (AST/SGOT): 24 (01-24-24 @ 18:18)  Bilirubin Total: 0.4 (01-25)  Bilirubin Total: <0.2 (01-24)      Trend LDH      Auto Eosinophil %: 0.1 % (01-24-24 @ 18:18)      Urinalysis Basic - ( 25 Jan 2024 08:36 )    Color: x / Appearance: x / SG: x / pH: x  Gluc: 116 mg/dL / Ketone: x  / Bili: x / Urobili: x   Blood: x / Protein: x / Nitrite: x   Leuk Esterase: x / RBC: x / WBC x   Sq Epi: x / Non Sq Epi: x / Bacteria: x        MICROBIOLOGY:            RADIOLOGY:  imaging below personally reviewed    < from: Xray Tibia + Fibula 2 Views, Left (01.24.24 @ 18:52) >  FINDINGS /  IMPRESSION:    No acute displaced fracture. Soft tissue swelling and skin thickening   without evidence of subcutaneous emphysema.      < end of copied text >

## 2024-01-25 NOTE — CONSULT NOTE ADULT - ASSESSMENT
34yo female with hx of Illicit drug usage, and ETOH usage presenting with LLE swelling, reddness, and pain that has been going on for the past several day, and progressively worsen.  fever at home.     ID is consulted for LLE wound  Febrile, with leukocytosis  Happened a few days ago after she shaved herself  No recent hospitalization, no recent abx use  Left leg Xray: No acute displaced fracture. Soft tissue swelling and skin thickening without evidence of subcutaneous emphysema.    Antibiotics:  Unasyn 1/24  Cefepime 1/24 - 1/25  Doxycycline 1/24  Vancomycin 1/25 ->      IMPRESSION:  LLE cellulitis  Underlying skin abscess  Substance abuse    RECOMMEDNTATIONS:  - D/C cefepime, continue IV vancomycin 750mg q12hrs  - Monitor vancomycin trough 30 minutes prior 4th dose, keep trough around 10 - 15, monitor Cr daily  - Follow up BCx  - Surgery follow up  - Addition recs appreciated  - Offloading and frequent position changes, aspiration precaution  - Trend WBC, fever curve, transaminases, creatinine daily      Lin Christy D.O.  Attending Physician  Division of Infectious Diseases  [facility]  Please contact me via [employer] Teams

## 2024-01-26 ENCOUNTER — TRANSCRIPTION ENCOUNTER (OUTPATIENT)
Age: 34
End: 2024-01-26

## 2024-01-26 VITALS
HEART RATE: 104 BPM | TEMPERATURE: 96 F | OXYGEN SATURATION: 99 % | SYSTOLIC BLOOD PRESSURE: 117 MMHG | DIASTOLIC BLOOD PRESSURE: 73 MMHG | RESPIRATION RATE: 18 BRPM

## 2024-01-26 LAB
1OH-MIDAZOLAM UR CFM-MCNC: NEGATIVE NG/ML — SIGNIFICANT CHANGE UP
7AMINOCLONAZEPAM UR CFM-MCNC: NEGATIVE NG/ML — SIGNIFICANT CHANGE UP
ALBUMIN SERPL ELPH-MCNC: 3.4 G/DL — LOW (ref 3.5–5.2)
ALP SERPL-CCNC: 72 U/L — SIGNIFICANT CHANGE UP (ref 30–115)
ALT FLD-CCNC: 15 U/L — SIGNIFICANT CHANGE UP (ref 0–41)
AMPHET UR QL SCN: NEGATIVE NG/ML — SIGNIFICANT CHANGE UP
AMPHETAMINE, UR RESULT: NEGATIVE NG/ML — SIGNIFICANT CHANGE UP
ANION GAP SERPL CALC-SCNC: 14 MMOL/L — SIGNIFICANT CHANGE UP (ref 7–14)
AST SERPL-CCNC: 18 U/L — SIGNIFICANT CHANGE UP (ref 0–41)
BASOPHILS # BLD AUTO: 0.01 K/UL — SIGNIFICANT CHANGE UP (ref 0–0.2)
BASOPHILS NFR BLD AUTO: 0.1 % — SIGNIFICANT CHANGE UP (ref 0–1)
BILIRUB SERPL-MCNC: <0.2 MG/DL — SIGNIFICANT CHANGE UP (ref 0.2–1.2)
BUN SERPL-MCNC: 7 MG/DL — LOW (ref 10–20)
CALCIUM SERPL-MCNC: 8.6 MG/DL — SIGNIFICANT CHANGE UP (ref 8.4–10.5)
CHLORIDE SERPL-SCNC: 99 MMOL/L — SIGNIFICANT CHANGE UP (ref 98–110)
CLONAZEPAM UR CFM-MCNC: NEGATIVE NG/ML — SIGNIFICANT CHANGE UP
CO2 SERPL-SCNC: 25 MMOL/L — SIGNIFICANT CHANGE UP (ref 17–32)
CREAT SERPL-MCNC: 0.6 MG/DL — LOW (ref 0.7–1.5)
DIAZEPAM UR CFM-MCNC: NEGATIVE NG/ML — SIGNIFICANT CHANGE UP
EGFR: 122 ML/MIN/1.73M2 — SIGNIFICANT CHANGE UP
EOSINOPHIL # BLD AUTO: 0.04 K/UL — SIGNIFICANT CHANGE UP (ref 0–0.7)
EOSINOPHIL NFR BLD AUTO: 0.3 % — SIGNIFICANT CHANGE UP (ref 0–8)
FLUNITRAZEPAM UR CFM-MCNC: NEGATIVE NG/ML — SIGNIFICANT CHANGE UP
FLURAZEPAM UR CFM-MCNC: NEGATIVE NG/ML — SIGNIFICANT CHANGE UP
GLUCOSE SERPL-MCNC: 101 MG/DL — HIGH (ref 70–99)
HCT VFR BLD CALC: 39 % — SIGNIFICANT CHANGE UP (ref 37–47)
HGB BLD-MCNC: 13.5 G/DL — SIGNIFICANT CHANGE UP (ref 12–16)
IMM GRANULOCYTES NFR BLD AUTO: 0.3 % — SIGNIFICANT CHANGE UP (ref 0.1–0.3)
LORAZEPAM UR CFM-MCNC: NEGATIVE NG/ML — SIGNIFICANT CHANGE UP
LYMPHOCYTES # BLD AUTO: 26.6 % — SIGNIFICANT CHANGE UP (ref 20.5–51.1)
LYMPHOCYTES # BLD AUTO: 3.28 K/UL — SIGNIFICANT CHANGE UP (ref 1.2–3.4)
MCHC RBC-ENTMCNC: 31.3 PG — HIGH (ref 27–31)
MCHC RBC-ENTMCNC: 34.6 G/DL — SIGNIFICANT CHANGE UP (ref 32–37)
MCV RBC AUTO: 90.5 FL — SIGNIFICANT CHANGE UP (ref 81–99)
MDEA, UR RESULT: NEGATIVE NG/ML — SIGNIFICANT CHANGE UP
MIDAZOLAM UR CFM-MCNC: NEGATIVE NG/ML — SIGNIFICANT CHANGE UP
MONOCYTES # BLD AUTO: 1.16 K/UL — HIGH (ref 0.1–0.6)
MONOCYTES NFR BLD AUTO: 9.4 % — HIGH (ref 1.7–9.3)
NEUTROPHILS # BLD AUTO: 7.81 K/UL — HIGH (ref 1.4–6.5)
NEUTROPHILS NFR BLD AUTO: 63.3 % — SIGNIFICANT CHANGE UP (ref 42.2–75.2)
NORDIAZEPAM, UR RESULT: NEGATIVE NG/ML — SIGNIFICANT CHANGE UP
NRBC # BLD: 0 /100 WBCS — SIGNIFICANT CHANGE UP (ref 0–0)
OXAZEPAM UR QL SCN: NEGATIVE NG/ML — SIGNIFICANT CHANGE UP
OXAZEPAM, UR RESULT: NEGATIVE NG/ML — SIGNIFICANT CHANGE UP
PLATELET # BLD AUTO: 261 K/UL — SIGNIFICANT CHANGE UP (ref 130–400)
PMV BLD: 10.1 FL — SIGNIFICANT CHANGE UP (ref 7.4–10.4)
POTASSIUM SERPL-MCNC: 3.9 MMOL/L — SIGNIFICANT CHANGE UP (ref 3.5–5)
POTASSIUM SERPL-SCNC: 3.9 MMOL/L — SIGNIFICANT CHANGE UP (ref 3.5–5)
PROT SERPL-MCNC: 6.6 G/DL — SIGNIFICANT CHANGE UP (ref 6–8)
RBC # BLD: 4.31 M/UL — SIGNIFICANT CHANGE UP (ref 4.2–5.4)
RBC # FLD: 11.9 % — SIGNIFICANT CHANGE UP (ref 11.5–14.5)
SODIUM SERPL-SCNC: 138 MMOL/L — SIGNIFICANT CHANGE UP (ref 135–146)
TEMAZEPAM UR CFM-MCNC: NEGATIVE NG/ML — SIGNIFICANT CHANGE UP
WBC # BLD: 12.34 K/UL — HIGH (ref 4.8–10.8)
WBC # FLD AUTO: 12.34 K/UL — HIGH (ref 4.8–10.8)

## 2024-01-26 PROCEDURE — 27603 DRAIN LOWER LEG LESION: CPT

## 2024-01-26 PROCEDURE — 99232 SBSQ HOSP IP/OBS MODERATE 35: CPT

## 2024-01-26 RX ORDER — KETAMINE HYDROCHLORIDE 100 MG/ML
100 INJECTION INTRAMUSCULAR; INTRAVENOUS ONCE
Refills: 0 | Status: COMPLETED | OUTPATIENT
Start: 2024-01-26 | End: 2024-01-26

## 2024-01-26 RX ORDER — FOLIC ACID 0.8 MG
1 TABLET ORAL DAILY
Refills: 0 | Status: DISCONTINUED | OUTPATIENT
Start: 2024-01-26 | End: 2024-01-27

## 2024-01-26 RX ORDER — ONDANSETRON 8 MG/1
4 TABLET, FILM COATED ORAL EVERY 8 HOURS
Refills: 0 | Status: DISCONTINUED | OUTPATIENT
Start: 2024-01-26 | End: 2024-01-27

## 2024-01-26 RX ORDER — SODIUM CHLORIDE 9 MG/ML
1000 INJECTION, SOLUTION INTRAVENOUS
Refills: 0 | Status: DISCONTINUED | OUTPATIENT
Start: 2024-01-26 | End: 2024-01-26

## 2024-01-26 RX ORDER — ACETAMINOPHEN 500 MG
1000 TABLET ORAL ONCE
Refills: 0 | Status: DISCONTINUED | OUTPATIENT
Start: 2024-01-26 | End: 2024-01-26

## 2024-01-26 RX ORDER — ONDANSETRON 8 MG/1
4 TABLET, FILM COATED ORAL ONCE
Refills: 0 | Status: DISCONTINUED | OUTPATIENT
Start: 2024-01-26 | End: 2024-01-26

## 2024-01-26 RX ORDER — HYDROMORPHONE HYDROCHLORIDE 2 MG/ML
0.5 INJECTION INTRAMUSCULAR; INTRAVENOUS; SUBCUTANEOUS
Refills: 0 | Status: DISCONTINUED | OUTPATIENT
Start: 2024-01-26 | End: 2024-01-27

## 2024-01-26 RX ORDER — THIAMINE MONONITRATE (VIT B1) 100 MG
100 TABLET ORAL DAILY
Refills: 0 | Status: DISCONTINUED | OUTPATIENT
Start: 2024-01-26 | End: 2024-01-27

## 2024-01-26 RX ORDER — VANCOMYCIN HCL 1 G
500 VIAL (EA) INTRAVENOUS EVERY 12 HOURS
Refills: 0 | Status: DISCONTINUED | OUTPATIENT
Start: 2024-01-26 | End: 2024-01-27

## 2024-01-26 RX ORDER — IBUPROFEN 200 MG
600 TABLET ORAL EVERY 6 HOURS
Refills: 0 | Status: DISCONTINUED | OUTPATIENT
Start: 2024-01-26 | End: 2024-01-27

## 2024-01-26 RX ORDER — OXYCODONE HYDROCHLORIDE 5 MG/1
5 TABLET ORAL ONCE
Refills: 0 | Status: DISCONTINUED | OUTPATIENT
Start: 2024-01-26 | End: 2024-01-26

## 2024-01-26 RX ORDER — HYDROXYZINE HCL 10 MG
25 TABLET ORAL EVERY 4 HOURS
Refills: 0 | Status: DISCONTINUED | OUTPATIENT
Start: 2024-01-26 | End: 2024-01-27

## 2024-01-26 RX ORDER — ACETAMINOPHEN 500 MG
650 TABLET ORAL EVERY 6 HOURS
Refills: 0 | Status: DISCONTINUED | OUTPATIENT
Start: 2024-01-26 | End: 2024-01-27

## 2024-01-26 RX ORDER — HYDROMORPHONE HYDROCHLORIDE 2 MG/ML
0.5 INJECTION INTRAMUSCULAR; INTRAVENOUS; SUBCUTANEOUS
Refills: 0 | Status: DISCONTINUED | OUTPATIENT
Start: 2024-01-26 | End: 2024-01-26

## 2024-01-26 RX ORDER — ENOXAPARIN SODIUM 100 MG/ML
40 INJECTION SUBCUTANEOUS EVERY 24 HOURS
Refills: 0 | Status: DISCONTINUED | OUTPATIENT
Start: 2024-01-26 | End: 2024-01-27

## 2024-01-26 RX ORDER — HYDROMORPHONE HYDROCHLORIDE 2 MG/ML
1 INJECTION INTRAMUSCULAR; INTRAVENOUS; SUBCUTANEOUS
Refills: 0 | Status: DISCONTINUED | OUTPATIENT
Start: 2024-01-26 | End: 2024-01-26

## 2024-01-26 RX ORDER — NICOTINE POLACRILEX 2 MG
1 GUM BUCCAL DAILY
Refills: 0 | Status: DISCONTINUED | OUTPATIENT
Start: 2024-01-26 | End: 2024-01-27

## 2024-01-26 RX ADMIN — SODIUM CHLORIDE 100 MILLILITER(S): 9 INJECTION, SOLUTION INTRAVENOUS at 20:14

## 2024-01-26 RX ADMIN — HEPARIN SODIUM 5000 UNIT(S): 5000 INJECTION INTRAVENOUS; SUBCUTANEOUS at 05:07

## 2024-01-26 RX ADMIN — Medication 250 MILLIGRAM(S): at 16:39

## 2024-01-26 RX ADMIN — SODIUM CHLORIDE 100 MILLILITER(S): 9 INJECTION, SOLUTION INTRAVENOUS at 11:14

## 2024-01-26 RX ADMIN — Medication 250 MILLIGRAM(S): at 05:07

## 2024-01-26 NOTE — BRIEF OPERATIVE NOTE - NSICDXBRIEFPROCEDURE_GEN_ALL_CORE_FT
PROCEDURES:  Incision and drainage of wound of left lower extremity 26-Jan-2024 19:50:57  Keny Hurtado

## 2024-01-26 NOTE — PRE-ANESTHESIA EVALUATION ADULT - NSANTHADDINFOFT_GEN_ALL_CORE
Discussed the results of the UDS with surgeon, anesthetic concerns conveyed, and risks explained. The surgeon expresses that the procedure is an emergency and must be done as a matter of life and limb.

## 2024-01-26 NOTE — PRE-ANESTHESIA EVALUATION ADULT - SPO2 (%)
January 23, 2019      [facility]  [address]  [place] [address]  Phone: [phone]       Patient: Joanna Silver   YOB: 2003  Date of Visit: 01/23/2019    To Whom It May Concern:    Uriel Silver  was at [facility] on 01/23/2019. She may return to work/school on 1/25/19 with no restrictions. If you have any questions or concerns, or if I can be of further assistance, please do not hesitate to contact me.    Sincerely,    Marine Aguero, NP     
[phone]

## 2024-01-26 NOTE — PROGRESS NOTE ADULT - ASSESSMENT
32 yo female with hx of Illicit drug usage, tob and etoh usage presenting with       #LLE cellulitis  -US of LE- no DVT  -follow Blood culture  -surgery consult appreciated, for I&D today  -continue vancomycin, ID consult appreciated  -Monitor vanco trough    #ETOH  Tob, and Illicit drug usage  -Counselled  -Nicotine patch, thiamine and folate   -PRN ativan, no need for taper  -Seizure precautions  -CIWA protocol  -Addication medicine consult appreciated

## 2024-01-26 NOTE — PROGRESS NOTE ADULT - SUBJECTIVE AND OBJECTIVE BOX
34 yo female with hx of Illicit drug usage, tob and etoh usage presenting with Cellulitis.    Today:  Seen at bedside, no new complaints.              REVIEW OF SYSTEMS:  No complaints      MEDICATIONS  (STANDING):  folic acid 1 milliGRAM(s) Oral daily  heparin   Injectable 5000 Unit(s) SubCutaneous every 12 hours  lactated ringers. 1000 milliLiter(s) (100 mL/Hr) IV Continuous <Continuous>  nicotine - 21 mG/24Hr(s) Patch 1 Patch Transdermal daily  thiamine 100 milliGRAM(s) Oral daily  vancomycin  IVPB 750 milliGRAM(s) IV Intermittent every 12 hours    MEDICATIONS  (PRN):  acetaminophen     Tablet .. 650 milliGRAM(s) Oral every 6 hours PRN Temp greater or equal to 38C (100.4F), Mild Pain (1 - 3)  hydrOXYzine hydrochloride 25 milliGRAM(s) Oral every 4 hours PRN Anxiety  LORazepam   Injectable 1 milliGRAM(s) IV Push every 4 hours PRN ciwa score above 8      Allergies  lithium (Unknown)        Vital Signs Last 24 Hrs  T(C): 35.7 (26 Jan 2024 17:25), Max: 37.2 (25 Jan 2024 20:16)  T(F): 96.3 (26 Jan 2024 16:08), Max: 99 (25 Jan 2024 20:16)  HR: 72 (26 Jan 2024 17:25) (72 - 94)  BP: 128/83 (26 Jan 2024 17:25) (105/66 - 128/83)  RR: 18 (26 Jan 2024 17:25) (16 - 18)  SpO2: 96% (26 Jan 2024 17:25) (96% - 96%)        PHYSICAL EXAM:  GENERAL: NAD,    HEAD:  Atraumatic, Normocephalic  EYES: EOMI, PERRLA, conjunctiva and sclera clear  NECK: Supple, No JVD, Normal thyroid  NERVOUS SYSTEM:  Alert & Oriented X3, Motor Strength 5/5 B/L upper and lower extremities; DTRs 2+ intact and symmetric  CHEST/LUNG: Clear to percussion bilaterally; No rales, rhonchi, wheezing, or rubs  HEART: Regular rate and rhythm; No murmurs, rubs, or gallops  ABDOMEN: Soft, Nontender, Nondistended; Bowel sounds present  EXTREMITIES: LLE erythema with tenderness, and swelling extending below the knee abd above ankle.  no joint involvement.  soft compartment    LABS:                        13.5   12.34 )-----------( 261      ( 26 Jan 2024 07:18 )             39.0     01-26    138  |  99  |  7<L>  ----------------------------<  101<H>  3.9   |  25  |  0.6<L>    Ca    8.6      26 Jan 2024 07:18    TPro  6.6  /  Alb  3.4<L>  /  TBili  <0.2  /  DBili  x   /  AST  18  /  ALT  15  /  AlkPhos  72  01-26      Urinalysis Basic - ( 26 Jan 2024 07:18 )    Color: x / Appearance: x / SG: x / pH: x  Gluc: 101 mg/dL / Ketone: x  / Bili: x / Urobili: x   Blood: x / Protein: x / Nitrite: x   Leuk Esterase: x / RBC: x / WBC x   Sq Epi: x / Non Sq Epi: x / Bacteria: x         Patient is on omeprazole.  Was given by Dr. Peoples.  Grandson calling asking if okay to give him the over the counter omeprazole.  It is 20 mg.  Advised that MD ordered 40 mg - so should take two of the tablets daily.    Abel asking what metoclopramide is for.  Advised is for nausea - and to take 1/2 hour before each meal.     Denies other questions or concerns at this time.  Encouraged to call back if other questions or concerns.

## 2024-01-26 NOTE — BRIEF OPERATIVE NOTE - OPERATION/FINDINGS
Incision and drainage of left lower extremity abscess. Approximately 15 cc of purulent fluid drainage. Copious irrigation performed.

## 2024-01-26 NOTE — PROGRESS NOTE ADULT - ATTENDING COMMENTS
The patient's leg is more tender.  The erythema has decreased markedly away from the lines that we alejandra yesterday.  There is more fluctuance in the area of the anterior tibial region which is consistent with a clinical abscess.  Because the patient is so difficult to examine and is in such pain and there is evidence for an abscess I did recommend bring her to the OR for incision and drainage of that region with cultures.  I will administer anesthesia.  We will keep her n.p.o. and administer IV fluids and continue the antibiotics.  I will use local anesthesia.  Risks and benefits were described at length.  She will sign close form consent in the preop area.  Thank you

## 2024-01-26 NOTE — PROGRESS NOTE ADULT - ASSESSMENT
33-year old female with hx of Illicit drug usage, and ETOH dependance is admitted for LLE cellulitis with abscess.  The patient has continued pain, and increasing size of fluctuance.    LLE cellulitis with abscess:  -Patient seen and examined with Dr. Finley  -Plan for OR today for I&D of abscess in late afternoon/evening  -Begin NPO  -IVF-LR  -Continue abx-vanco  -Will continue to follow 33-year old female with hx of Illicit drug usage, and ETOH dependance is admitted for LLE cellulitis with abscess.  The patient has continued pain, and increasing size of fluctuance.    LLE cellulitis with abscess:  -Patient seen and examined with Dr. Finley  -Plan for OR today for I&D of abscess in late afternoon/evening  -Begin NPO  -IVF-LR  -Continue abx-vanco  -Will continue to follow  - will need VNS for wound care   - OOB   - pain control     rna nd medical team aware

## 2024-01-26 NOTE — PROGRESS NOTE ADULT - SUBJECTIVE AND OBJECTIVE BOX
Subjective  33-year old female with hx of Illicit drug usage, and ETOH dependance is admitted for LLE cellulitis with abscess.  No acute events overnight.  She states her leg is still very painful.  She ate breakfast this morning around 8am.  She denies fever, chills, nausea, vomiting and shortness of breath.    Objective  Vital Signs Last 24 Hrs  T(C): 36.2 (26 Jan 2024 05:00), Max: 37.9 (25 Jan 2024 14:54)  T(F): 97.2 (26 Jan 2024 05:00), Max: 100.2 (25 Jan 2024 14:54)  HR: 94 (26 Jan 2024 05:00) (84 - 99)  BP: 105/66 (26 Jan 2024 05:00) (105/66 - 119/78)  RR: 16 (26 Jan 2024 05:00) (16 - 18)  SpO2: 96% (25 Jan 2024 20:16) (96% - 96%)    Physical Exam:  Gen: A&Ox3, no acute distress  Skin: LLE erythema, decreased in size from yesterday, 0.5cm scab on anterior tibia, fluctuant area superior to scab increased in size from yesterday, +TTP anterior LLE  Extremities no calf tenderness    Labs:  CBC:            13.5   12.34 )-----------( 261      ( 01-26-24 @ 07:18 )             39.0         Chem:         ( 01-26-24 @ 07:18 )    138  |  99  |  7<L>  ----------------------------<  101<H>  3.9   |  25  |  0.6<L>        Liver Functions: ( 01-26-24 @ 07:18 )  Alb: 3.4 g/dL / Pro: 6.6 g/dL / ALK PHOS: 72 U/L / ALT: 15 U/L / AST: 18 U/L / GGT: x         Imaging:  DUPLEX SCAN EXT VEINS LOWER BI     PROCEDURE DATE:  01/24/2024    Impression:  No evidence of deep venous thrombosis or superficial thrombophlebitis in   the bilateral lower extremities.  XR TIB FIB AP LAT 2 VIEWS LT   ORDERED BY: DARIO LOYA     PROCEDURE DATE:  01/24/2024      INTERPRETATION:  CLINICAL HISTORY / REASON FOR EXAM: Pain; abscess  IMPRESSION:  No acute displaced fracture. Soft tissue swelling and skin thickening   without evidence of subcutaneous emphysema.    MEDICATIONS  (STANDING):  folic acid 1 milliGRAM(s) Oral daily  heparin   Injectable 5000 Unit(s) SubCutaneous every 12 hours  lactated ringers. 1000 milliLiter(s) (100 mL/Hr) IV Continuous <Continuous>  nicotine - 21 mG/24Hr(s) Patch 21 Patch Transdermal daily  thiamine 100 milliGRAM(s) Oral daily  vancomycin  IVPB 750 milliGRAM(s) IV Intermittent every 12 hours    MEDICATIONS  (PRN):  acetaminophen     Tablet .. 650 milliGRAM(s) Oral every 6 hours PRN Temp greater or equal to 38C (100.4F), Mild Pain (1 - 3)  hydrOXYzine hydrochloride 25 milliGRAM(s) Oral every 4 hours PRN Anxiety  LORazepam   Injectable 1 milliGRAM(s) IV Push every 4 hours PRN ciwa score above 8   Subjective  33-year old female with hx of Illicit drug usage, and ETOH dependance is admitted for LLE cellulitis with abscess.  No acute events overnight.  She states her leg is still very painful.  She ate breakfast this morning around 8am.      She denies fever, chills, nausea, vomiting and shortness of breath.    Objective  Vital Signs Last 24 Hrs  T(C): 36.2 (26 Jan 2024 05:00), Max: 37.9 (25 Jan 2024 14:54)  T(F): 97.2 (26 Jan 2024 05:00), Max: 100.2 (25 Jan 2024 14:54)  HR: 94 (26 Jan 2024 05:00) (84 - 99)  BP: 105/66 (26 Jan 2024 05:00) (105/66 - 119/78)  RR: 16 (26 Jan 2024 05:00) (16 - 18)  SpO2: 96% (25 Jan 2024 20:16) (96% - 96%)    Physical Exam:  Gen: A&Ox3, no acute distress  Skin: LLE erythema, decreased in size from yesterday, 0.5cm scab on anterior tibia, fluctuant area superior to scab increased in size from yesterday, +TTP anterior LLE  Extremities no calf tenderness    Labs:  CBC:            13.5   12.34 )-----------( 261      ( 01-26-24 @ 07:18 )             39.0         Chem:         ( 01-26-24 @ 07:18 )    138  |  99  |  7<L>  ----------------------------<  101<H>  3.9   |  25  |  0.6<L>        Liver Functions: ( 01-26-24 @ 07:18 )  Alb: 3.4 g/dL / Pro: 6.6 g/dL / ALK PHOS: 72 U/L / ALT: 15 U/L / AST: 18 U/L / GGT: x         Imaging:  DUPLEX SCAN EXT VEINS LOWER BI     PROCEDURE DATE:  01/24/2024    Impression:  No evidence of deep venous thrombosis or superficial thrombophlebitis in   the bilateral lower extremities.  XR TIB FIB AP LAT 2 VIEWS LT   ORDERED BY: DARIO LOYA     PROCEDURE DATE:  01/24/2024      INTERPRETATION:  CLINICAL HISTORY / REASON FOR EXAM: Pain; abscess  IMPRESSION:  No acute displaced fracture. Soft tissue swelling and skin thickening   without evidence of subcutaneous emphysema.    MEDICATIONS  (STANDING):  folic acid 1 milliGRAM(s) Oral daily  heparin   Injectable 5000 Unit(s) SubCutaneous every 12 hours  lactated ringers. 1000 milliLiter(s) (100 mL/Hr) IV Continuous <Continuous>  nicotine - 21 mG/24Hr(s) Patch 21 Patch Transdermal daily  thiamine 100 milliGRAM(s) Oral daily  vancomycin  IVPB 750 milliGRAM(s) IV Intermittent every 12 hours    MEDICATIONS  (PRN):  acetaminophen     Tablet .. 650 milliGRAM(s) Oral every 6 hours PRN Temp greater or equal to 38C (100.4F), Mild Pain (1 - 3)  hydrOXYzine hydrochloride 25 milliGRAM(s) Oral every 4 hours PRN Anxiety  LORazepam   Injectable 1 milliGRAM(s) IV Push every 4 hours PRN ciwa score above 8

## 2024-01-26 NOTE — PROGRESS NOTE ADULT - SUBJECTIVE AND OBJECTIVE BOX
33-year old female with hx of Illicit drug usage, and ETOH dependance is admitted for LLE cellulitis with abscess.   S/P   33-year old female with hx of Illicit drug usage, and ETOH dependance is admitted for LLE cellulitis with abscess.   S/P    ·  PROCEDURES:  Incision and drainage of wound of left lower extremity 26-Jan-2024 19:50:57  Keny Hurtado.       Operative Findings:  · Operative Findings	Incision and drainage of left lower extremity abscess. Approximately 15 cc of purulent fluid drainage. Copious irrigation performed.  seen and  examined  in bed  awake  /alert  V/S 96.3,   121/73   104   18  PE   ALERT  PULM CLEAR  CVS S1S2    LLE  DRESSING  CDI  + PULSES  + DP/ PT  A &P  LLE cellulitis with abscess.   S/P    ·  PROCEDURES:  Incision and drainage of wound of left lower extremity 26-Jan-2024 19:50:57  Keny Hurtado.       Operative Findings:  · Operative Findings	Incision and drainage of left lower extremity abscess. Approximately 15 cc of purulent fluid drainage. Copious irrigation performed.  ON IV ABX  CEFEPIME/ VANCO  PAIN MGMT  DVT/ GI PROPHYLAXIS  WILL FOLLOW

## 2024-01-27 NOTE — CHART NOTE - NSCHARTNOTEFT_GEN_A_CORE
Patient eloped with out being seen, attempted to contact patient, but unable to leave a message.  patient was advised against leaving ama by staff

## 2024-01-28 LAB
-  AMPICILLIN/SULBACTAM: SIGNIFICANT CHANGE UP
-  AMPICILLIN/SULBACTAM: SIGNIFICANT CHANGE UP
-  CEFAZOLIN: SIGNIFICANT CHANGE UP
-  CEFAZOLIN: SIGNIFICANT CHANGE UP
-  CLINDAMYCIN: SIGNIFICANT CHANGE UP
-  CLINDAMYCIN: SIGNIFICANT CHANGE UP
-  DAPTOMYCIN: SIGNIFICANT CHANGE UP
-  DAPTOMYCIN: SIGNIFICANT CHANGE UP
-  ERYTHROMYCIN: SIGNIFICANT CHANGE UP
-  ERYTHROMYCIN: SIGNIFICANT CHANGE UP
-  GENTAMICIN: SIGNIFICANT CHANGE UP
-  GENTAMICIN: SIGNIFICANT CHANGE UP
-  LINEZOLID: SIGNIFICANT CHANGE UP
-  LINEZOLID: SIGNIFICANT CHANGE UP
-  OXACILLIN: SIGNIFICANT CHANGE UP
-  OXACILLIN: SIGNIFICANT CHANGE UP
-  PENICILLIN: SIGNIFICANT CHANGE UP
-  PENICILLIN: SIGNIFICANT CHANGE UP
-  RIFAMPIN: SIGNIFICANT CHANGE UP
-  RIFAMPIN: SIGNIFICANT CHANGE UP
-  TETRACYCLINE: SIGNIFICANT CHANGE UP
-  TETRACYCLINE: SIGNIFICANT CHANGE UP
-  TRIMETHOPRIM/SULFAMETHOXAZOLE: SIGNIFICANT CHANGE UP
-  TRIMETHOPRIM/SULFAMETHOXAZOLE: SIGNIFICANT CHANGE UP
-  VANCOMYCIN: SIGNIFICANT CHANGE UP
-  VANCOMYCIN: SIGNIFICANT CHANGE UP
CULTURE RESULTS: ABNORMAL
CULTURE RESULTS: ABNORMAL
METHOD TYPE: SIGNIFICANT CHANGE UP
METHOD TYPE: SIGNIFICANT CHANGE UP
ORGANISM # SPEC MICROSCOPIC CNT: ABNORMAL
ORGANISM # SPEC MICROSCOPIC CNT: ABNORMAL
ORGANISM # SPEC MICROSCOPIC CNT: SIGNIFICANT CHANGE UP
ORGANISM # SPEC MICROSCOPIC CNT: SIGNIFICANT CHANGE UP
SPECIMEN SOURCE: SIGNIFICANT CHANGE UP
SPECIMEN SOURCE: SIGNIFICANT CHANGE UP

## 2024-01-29 LAB
ALPHA-HYDROXYALPRAZOLAM, UR RESULT: 222 NG/ML — HIGH
ALPRAZ UR CFM-MCNC: 105 NG/ML — HIGH
AMPHET UR QL SCN: POSITIVE
AMPHETAMINE IN-HOUSE INTERPRETATION: POSITIVE
BENZODIAZ UR QL SCN: POSITIVE
BENZODIAZEPINES IN-HOUSE INTERPRETATION: POSITIVE
BENZOYLEGONINE, UR RESULT: SIGNIFICANT CHANGE UP NG/ML
BZE UR QL SCN: SIGNIFICANT CHANGE UP NG/ML
COCAINE IN-HOUSE INTERPRETATION: POSITIVE
COCAINE UR QL SCN: POSITIVE
MDA UR QL SCN: 1635 NG/ML — HIGH
MDA, UR RESULT: 1635 NG/ML — HIGH
MDMA UR QL SCN: SIGNIFICANT CHANGE UP NG/ML
MDMA, UR RESULT: SIGNIFICANT CHANGE UP NG/ML
METHAMPHET UR QL SCN: NEGATIVE NG/ML — SIGNIFICANT CHANGE UP
METHAMPHETAMINE, UR RESULT: NEGATIVE NG/ML — SIGNIFICANT CHANGE UP

## 2024-01-29 NOTE — CHART NOTE - NSCHARTNOTEFT_GEN_A_CORE
Asked by Dr. Finley to send a prescription for Bactrim DS 2 tabs PO every 12 hrs x 14 days  Rx sent to pharmacy on 1/29/24 Asked by Dr. Finley to send a prescription for Bactrim DS 2 tabs PO every 12 hrs x 14 days  Rx sent to pharmacy on 1/29/24        I spoke to the patient on the phone and instructed her to  the Bactrim.  I recommend that she follow back up in the ER so that I can check the wound and she should see me back in the office as feasible.  Thank you

## 2024-01-30 LAB
CULTURE RESULTS: SIGNIFICANT CHANGE UP
SPECIMEN SOURCE: SIGNIFICANT CHANGE UP

## 2024-02-02 ENCOUNTER — APPOINTMENT (OUTPATIENT)
Dept: SURGERY | Facility: CLINIC | Age: 34
End: 2024-02-02

## 2024-02-09 DIAGNOSIS — Z88.8 ALLERGY STATUS TO OTHER DRUGS, MEDICAMENTS AND BIOLOGICAL SUBSTANCES STATUS: ICD-10-CM

## 2024-02-09 DIAGNOSIS — F10.20 ALCOHOL DEPENDENCE, UNCOMPLICATED: ICD-10-CM

## 2024-02-09 DIAGNOSIS — I10 ESSENTIAL (PRIMARY) HYPERTENSION: ICD-10-CM

## 2024-02-09 DIAGNOSIS — F19.20 OTHER PSYCHOACTIVE SUBSTANCE DEPENDENCE, UNCOMPLICATED: ICD-10-CM

## 2024-02-09 DIAGNOSIS — L02.416 CUTANEOUS ABSCESS OF LEFT LOWER LIMB: ICD-10-CM

## 2024-02-09 DIAGNOSIS — F41.9 ANXIETY DISORDER, UNSPECIFIED: ICD-10-CM

## 2024-02-09 DIAGNOSIS — L03.116 CELLULITIS OF LEFT LOWER LIMB: ICD-10-CM

## 2024-02-09 DIAGNOSIS — J45.909 UNSPECIFIED ASTHMA, UNCOMPLICATED: ICD-10-CM

## 2024-02-09 DIAGNOSIS — F14.20 COCAINE DEPENDENCE, UNCOMPLICATED: ICD-10-CM

## 2024-07-12 ENCOUNTER — INPATIENT (INPATIENT)
Facility: HOSPITAL | Age: 34
LOS: 1 days | Discharge: LEFT BEFORE DISCHARGE | End: 2024-07-14
Attending: HOSPITALIST | Admitting: STUDENT IN AN ORGANIZED HEALTH CARE EDUCATION/TRAINING PROGRAM
Payer: MEDICAID

## 2024-07-12 VITALS
OXYGEN SATURATION: 98 % | RESPIRATION RATE: 20 BRPM | HEIGHT: 62 IN | TEMPERATURE: 100 F | HEART RATE: 115 BPM | WEIGHT: 130.07 LBS | DIASTOLIC BLOOD PRESSURE: 90 MMHG | SYSTOLIC BLOOD PRESSURE: 137 MMHG

## 2024-07-12 LAB
APPEARANCE UR: ABNORMAL
BASOPHILS # BLD AUTO: 0.07 K/UL — SIGNIFICANT CHANGE UP (ref 0–0.2)
BASOPHILS NFR BLD AUTO: 0.5 % — SIGNIFICANT CHANGE UP (ref 0–1)
BILIRUB UR-MCNC: NEGATIVE — SIGNIFICANT CHANGE UP
COLOR SPEC: YELLOW — SIGNIFICANT CHANGE UP
DIFF PNL FLD: ABNORMAL
EOSINOPHIL # BLD AUTO: 0.45 K/UL — SIGNIFICANT CHANGE UP (ref 0–0.7)
EOSINOPHIL NFR BLD AUTO: 3.2 % — SIGNIFICANT CHANGE UP (ref 0–8)
GAS PNL BLDV: SIGNIFICANT CHANGE UP
GLUCOSE UR QL: NEGATIVE MG/DL — SIGNIFICANT CHANGE UP
HCT VFR BLD CALC: 36.7 % — LOW (ref 37–47)
HGB BLD-MCNC: 12.7 G/DL — SIGNIFICANT CHANGE UP (ref 12–16)
IMM GRANULOCYTES NFR BLD AUTO: 0.3 % — SIGNIFICANT CHANGE UP (ref 0.1–0.3)
KETONES UR-MCNC: ABNORMAL MG/DL
LEUKOCYTE ESTERASE UR-ACNC: NEGATIVE — SIGNIFICANT CHANGE UP
LYMPHOCYTES # BLD AUTO: 24.5 % — SIGNIFICANT CHANGE UP (ref 20.5–51.1)
LYMPHOCYTES # BLD AUTO: 3.5 K/UL — HIGH (ref 1.2–3.4)
MCHC RBC-ENTMCNC: 31.3 PG — HIGH (ref 27–31)
MCHC RBC-ENTMCNC: 34.6 G/DL — SIGNIFICANT CHANGE UP (ref 32–37)
MCV RBC AUTO: 90.4 FL — SIGNIFICANT CHANGE UP (ref 81–99)
MONOCYTES # BLD AUTO: 0.87 K/UL — HIGH (ref 0.1–0.6)
MONOCYTES NFR BLD AUTO: 6.1 % — SIGNIFICANT CHANGE UP (ref 1.7–9.3)
NEUTROPHILS # BLD AUTO: 9.35 K/UL — HIGH (ref 1.4–6.5)
NEUTROPHILS NFR BLD AUTO: 65.4 % — SIGNIFICANT CHANGE UP (ref 42.2–75.2)
NITRITE UR-MCNC: NEGATIVE — SIGNIFICANT CHANGE UP
NRBC # BLD: 0 /100 WBCS — SIGNIFICANT CHANGE UP (ref 0–0)
NRBC BLD-RTO: 0 /100 WBCS — SIGNIFICANT CHANGE UP (ref 0–0)
PH UR: 7 — SIGNIFICANT CHANGE UP (ref 5–8)
PLATELET # BLD AUTO: 334 K/UL — SIGNIFICANT CHANGE UP (ref 130–400)
PMV BLD: 9.2 FL — SIGNIFICANT CHANGE UP (ref 7.4–10.4)
PROT UR-MCNC: SIGNIFICANT CHANGE UP MG/DL
RBC # BLD: 4.06 M/UL — LOW (ref 4.2–5.4)
RBC # FLD: 11.8 % — SIGNIFICANT CHANGE UP (ref 11.5–14.5)
SP GR SPEC: 1.02 — SIGNIFICANT CHANGE UP (ref 1–1.03)
UROBILINOGEN FLD QL: 1 MG/DL — SIGNIFICANT CHANGE UP (ref 0.2–1)
WBC # BLD: 14.28 K/UL — HIGH (ref 4.8–10.8)
WBC # FLD AUTO: 14.28 K/UL — HIGH (ref 4.8–10.8)

## 2024-07-12 PROCEDURE — 73140 X-RAY EXAM OF FINGER(S): CPT | Mod: 26,RT

## 2024-07-12 PROCEDURE — 99285 EMERGENCY DEPT VISIT HI MDM: CPT

## 2024-07-12 RX ORDER — VANCOMYCIN HCL IN 5 % DEXTROSE 1.5G/250ML
1000 PLASTIC BAG, INJECTION (ML) INTRAVENOUS ONCE
Refills: 0 | Status: COMPLETED | OUTPATIENT
Start: 2024-07-12 | End: 2024-07-12

## 2024-07-12 RX ADMIN — Medication 250 MILLIGRAM(S): at 23:45

## 2024-07-12 RX ADMIN — Medication 4000 MILLILITER(S): at 23:45

## 2024-07-13 DIAGNOSIS — L08.9 LOCAL INFECTION OF THE SKIN AND SUBCUTANEOUS TISSUE, UNSPECIFIED: ICD-10-CM

## 2024-07-13 LAB
ALBUMIN SERPL ELPH-MCNC: 4.3 G/DL — SIGNIFICANT CHANGE UP (ref 3.5–5.2)
ALP SERPL-CCNC: 102 U/L — SIGNIFICANT CHANGE UP (ref 30–115)
ALT FLD-CCNC: 14 U/L — SIGNIFICANT CHANGE UP (ref 0–41)
ANION GAP SERPL CALC-SCNC: 14 MMOL/L — SIGNIFICANT CHANGE UP (ref 7–14)
AST SERPL-CCNC: 20 U/L — SIGNIFICANT CHANGE UP (ref 0–41)
BILIRUB SERPL-MCNC: 0.3 MG/DL — SIGNIFICANT CHANGE UP (ref 0.2–1.2)
BUN SERPL-MCNC: 8 MG/DL — LOW (ref 10–20)
CALCIUM SERPL-MCNC: 9.2 MG/DL — SIGNIFICANT CHANGE UP (ref 8.4–10.5)
CHLORIDE SERPL-SCNC: 107 MMOL/L — SIGNIFICANT CHANGE UP (ref 98–110)
CO2 SERPL-SCNC: 23 MMOL/L — SIGNIFICANT CHANGE UP (ref 17–32)
CREAT SERPL-MCNC: 0.7 MG/DL — SIGNIFICANT CHANGE UP (ref 0.7–1.5)
CRP SERPL-MCNC: 12.1 MG/L — HIGH
EGFR: 117 ML/MIN/1.73M2 — SIGNIFICANT CHANGE UP
EGFR: 117 ML/MIN/1.73M2 — SIGNIFICANT CHANGE UP
ERYTHROCYTE [SEDIMENTATION RATE] IN BLOOD: 23 MM/HR — HIGH (ref 0–20)
GLUCOSE SERPL-MCNC: 87 MG/DL — SIGNIFICANT CHANGE UP (ref 70–99)
HCG SERPL QL: NEGATIVE — SIGNIFICANT CHANGE UP
HIV 1 & 2 AB SERPL IA.RAPID: SIGNIFICANT CHANGE UP
POTASSIUM SERPL-MCNC: 4 MMOL/L — SIGNIFICANT CHANGE UP (ref 3.5–5)
POTASSIUM SERPL-SCNC: 4 MMOL/L — SIGNIFICANT CHANGE UP (ref 3.5–5)
PROT SERPL-MCNC: 7.1 G/DL — SIGNIFICANT CHANGE UP (ref 6–8)
SODIUM SERPL-SCNC: 144 MMOL/L — SIGNIFICANT CHANGE UP (ref 135–146)
SPECIMEN SOURCE: SIGNIFICANT CHANGE UP

## 2024-07-13 PROCEDURE — 99284 EMERGENCY DEPT VISIT MOD MDM: CPT | Mod: GC

## 2024-07-13 RX ORDER — CEFAZOLIN SODIUM IN 0.9 % NACL 3 G/100 ML
1000 INTRAVENOUS SOLUTION, PIGGYBACK (ML) INTRAVENOUS EVERY 8 HOURS
Refills: 0 | Status: DISCONTINUED | OUTPATIENT
Start: 2024-07-13 | End: 2024-07-14

## 2024-07-13 RX ORDER — IBUPROFEN 200 MG
600 TABLET ORAL ONCE
Refills: 0 | Status: COMPLETED | OUTPATIENT
Start: 2024-07-13 | End: 2024-07-13

## 2024-07-13 RX ADMIN — Medication 600 MILLIGRAM(S): at 02:33

## 2024-07-14 LAB
C TRACH RRNA SPEC QL NAA+PROBE: SIGNIFICANT CHANGE UP
N GONORRHOEA RRNA SPEC QL NAA+PROBE: SIGNIFICANT CHANGE UP

## 2024-07-18 LAB
CULTURE RESULTS: SIGNIFICANT CHANGE UP
CULTURE RESULTS: SIGNIFICANT CHANGE UP
SPECIMEN SOURCE: SIGNIFICANT CHANGE UP
SPECIMEN SOURCE: SIGNIFICANT CHANGE UP

## 2024-07-22 DIAGNOSIS — Z88.8 ALLERGY STATUS TO OTHER DRUGS, MEDICAMENTS AND BIOLOGICAL SUBSTANCES: ICD-10-CM

## 2024-07-22 DIAGNOSIS — J45.909 UNSPECIFIED ASTHMA, UNCOMPLICATED: ICD-10-CM

## 2024-07-22 DIAGNOSIS — F10.20 ALCOHOL DEPENDENCE, UNCOMPLICATED: ICD-10-CM

## 2024-07-22 DIAGNOSIS — L03.011 CELLULITIS OF RIGHT FINGER: ICD-10-CM

## 2024-07-22 DIAGNOSIS — A41.9 SEPSIS, UNSPECIFIED ORGANISM: ICD-10-CM

## 2024-07-22 DIAGNOSIS — Z23 ENCOUNTER FOR IMMUNIZATION: ICD-10-CM

## 2024-07-22 DIAGNOSIS — Z53.29 PROCEDURE AND TREATMENT NOT CARRIED OUT BECAUSE OF PATIENT'S DECISION FOR OTHER REASONS: ICD-10-CM

## 2024-07-22 DIAGNOSIS — F11.90 OPIOID USE, UNSPECIFIED, UNCOMPLICATED: ICD-10-CM

## 2024-12-28 NOTE — ED PROVIDER NOTE - CLINICAL SUMMARY MEDICAL DECISION MAKING FREE TEXT BOX
28 yo female present w/k back pain and tinea pedis. will given toradal/robaxin/decadron for pain. instructed to f/u w/ rehab clinic and pcp. given return precautions. Negative